# Patient Record
Sex: MALE | Race: ASIAN | NOT HISPANIC OR LATINO | URBAN - METROPOLITAN AREA
[De-identification: names, ages, dates, MRNs, and addresses within clinical notes are randomized per-mention and may not be internally consistent; named-entity substitution may affect disease eponyms.]

---

## 2020-05-11 ENCOUNTER — TELEMEDICINE (OUTPATIENT)
Dept: PRIMARY CARE | Facility: CLINIC | Age: 55
End: 2020-05-11
Payer: COMMERCIAL

## 2020-05-11 ENCOUNTER — TELEPHONE (OUTPATIENT)
Dept: PRIMARY CARE | Facility: CLINIC | Age: 55
End: 2020-05-11

## 2020-05-11 DIAGNOSIS — M47.812 CERVICAL SPONDYLOSIS: Primary | Chronic | ICD-10-CM

## 2020-05-11 DIAGNOSIS — R46.89 BEHAVIOR CONCERN: ICD-10-CM

## 2020-05-11 PROCEDURE — 99203 OFFICE O/P NEW LOW 30 MIN: CPT | Mod: 95 | Performed by: FAMILY MEDICINE

## 2020-05-11 RX ORDER — CETIRIZINE HYDROCHLORIDE 10 MG/1
10 TABLET ORAL DAILY
COMMUNITY
Start: 2016-05-24 | End: 2020-05-11 | Stop reason: ALTCHOICE

## 2020-05-11 SDOH — ECONOMIC STABILITY: FOOD INSECURITY: WITHIN THE PAST 12 MONTHS, THE FOOD YOU BOUGHT JUST DIDN'T LAST AND YOU DIDN'T HAVE MONEY TO GET MORE.: NEVER TRUE

## 2020-05-11 SDOH — ECONOMIC STABILITY: TRANSPORTATION INSECURITY: IN THE PAST 12 MONTHS, HAS LACK OF TRANSPORTATION KEPT YOU FROM MEDICAL APPOINTMENTS OR FROM GETTING MEDICATIONS?: NO

## 2020-05-11 SDOH — ECONOMIC STABILITY: FOOD INSECURITY: WITHIN THE PAST 12 MONTHS, YOU WORRIED THAT YOUR FOOD WOULD RUN OUT BEFORE YOU GOT THE MONEY TO BUY MORE.: NEVER TRUE

## 2020-05-11 ASSESSMENT — ENCOUNTER SYMPTOMS
SINUS PRESSURE: 0
NECK PAIN: 0
NAUSEA: 0
BACK PAIN: 0
FREQUENCY: 0
LIGHT-HEADEDNESS: 0
EYE REDNESS: 0
NERVOUS/ANXIOUS: 0
RHINORRHEA: 0
VOMITING: 0
FLANK PAIN: 0
WEAKNESS: 0
NUMBNESS: 0
FATIGUE: 0
ARTHRALGIAS: 0
ABDOMINAL PAIN: 0
HEMATURIA: 0
CONFUSION: 0
BRUISES/BLEEDS EASILY: 0
COUGH: 0
SPEECH DIFFICULTY: 0
SHORTNESS OF BREATH: 0
WHEEZING: 0
EYE DISCHARGE: 0
SLEEP DISTURBANCE: 0
DIFFICULTY URINATING: 0
DIZZINESS: 0
FEVER: 0
CHEST TIGHTNESS: 0
CHILLS: 0
DYSURIA: 0
MYALGIAS: 0
CONSTIPATION: 0
UNEXPECTED WEIGHT CHANGE: 0
DYSPHORIC MOOD: 0
EYE PAIN: 0
BLOOD IN STOOL: 0
APPETITE CHANGE: 0
TROUBLE SWALLOWING: 0
SORE THROAT: 0
DIARRHEA: 0
HEADACHES: 0
NECK STIFFNESS: 0
SINUS PAIN: 0

## 2020-05-11 ASSESSMENT — ACTIVITIES OF DAILY LIVING (ADL): LACK_OF_TRANSPORTATION: NO

## 2020-05-11 NOTE — PROGRESS NOTES
Rodger Cuba MD    [facility]  [address], [address]  [place] [address]  Phone: [phone]  Fax: [phone]     History of Present Illness     Subjective     Patient ID: Dru Salazar is a 54 y.o. male.      Verification of Patient Location:  The patient affirms they are currently located in the following state:[place]           Request for Consent:   Video Encounter   Ann, my name is Rodger Cuba MD.  Before we proceed, can you please verify your identification by telling me your full name and date of birth?  Can you tell me who is in the room with you?    You and I are about to have a telemedicine check-in or visit because you have requested it.  This is a live video-conference.  I am a real person, speaking to you in real time.  There is no one else with me on the video-conference.  However, when we use (Cloud[employer], doc[employer]re, etc) it is important for you to know that the video-conference may not be secure or private.  I am not recording this conversation and I am asking you not to record it.  This telemedicine visit will be billed to your health insurance or you, if you are self-insured.  You understand you will be responsible for any copayments or coinsurances that apply to your telemedicine visit.  Before starting our telemedicine visit, I am required to get your consent for this virtual check-in or visit by telemedicine. Do you consent?    Patient Response to Request for Consent:  Yes      History of cervical spondylosis. Stable at this time.     Encounter to get established.           Review of Systems   Constitutional: Negative for appetite change, chills, fatigue, fever and unexpected weight change.   HENT: Negative for congestion, ear pain, hearing loss, nosebleeds, postnasal drip, rhinorrhea, sinus pressure, sinus pain, sneezing, sore throat, tinnitus and trouble swallowing.    Eyes: Negative for pain, discharge, redness and visual disturbance.    Respiratory: Negative for cough, chest tightness, shortness of breath and wheezing.    Cardiovascular: Negative for chest pain and leg swelling.   Gastrointestinal: Negative for abdominal pain, blood in stool, constipation, diarrhea, nausea and vomiting.   Endocrine: Negative for cold intolerance and heat intolerance.   Genitourinary: Negative for decreased urine volume, difficulty urinating, dysuria, flank pain, frequency, hematuria and urgency.   Musculoskeletal: Negative for arthralgias, back pain, gait problem, myalgias, neck pain and neck stiffness.   Skin: Negative for rash.   Allergic/Immunologic: Negative for environmental allergies.   Neurological: Negative for dizziness, syncope, speech difficulty, weakness, light-headedness, numbness and headaches.   Hematological: Does not bruise/bleed easily.   Psychiatric/Behavioral: Negative for behavioral problems, confusion, dysphoric mood and sleep disturbance. The patient is not nervous/anxious.           Past Medical/Surgical/Family/Social History       The following have been reviewed and updated as appropriate in this visit:  Allergies  Meds  Problems         Past Medical History:   Diagnosis Date   • Cervical spondylosis 5/11/2020    MRI in 03/2018 with Mild multilevel degenerative disc, uncovertebral and facet joint disease with mild central canal and foraminal stenosis from C4-5 to C6-7        Past Surgical History:   Procedure Laterality Date   • WISDOM TOOTH EXTRACTION         Family History   Problem Relation Age of Onset   • Arthritis Biological Mother    • Heart disease Biological Father         CAD   • Diabetes Biological Father    • Hyperlipidemia Biological Father    • Schizophrenia Biological Sister    • No Known Problems Biological Son    • No Known Problems Biological Daughter        Social History     Tobacco Use   • Smoking status: Never Smoker   • Smokeless tobacco: Never Used   Substance Use Topics   • Alcohol use: Yes     Alcohol/week:  2.0 - 3.0 standard drinks     Types: 2 - 3 Standard drinks or equivalent per week   • Drug use: Not Currently       Patient Care Team:  Rodger Cuba MD as PCP - General (Family Medicine)       Allergies and Medications       Allergies   Allergen Reactions   • Sulfa (Sulfonamide Antibiotics) Hives         No current outpatient medications on file.     No current facility-administered medications for this visit.                 Physical Examination       Objective     There were no vitals filed for this visit.    Pulse Readings from Last 5 Encounters:   No data found for Pulse       Wt Readings from Last 5 Encounters:   No data found for Wt       Ht Readings from Last 5 Encounters:   No data found for Ht       BP Readings from Last 5 Encounters:   No data found for BP       BMI Readings from Last 4 Encounters:   No data found for BMI          Laboratory Results     No results found for: WBC, HGB, HCT, MCV, PLT     No results found for: GLUCOSE, CALCIUM, NA, K, CO2, CL, BUN, CREATININE, CALCIUM, ALKPHOS, AST, ALT, BILITOT, ALBUMIN    No results found for: CHOL  No results found for: HDL  No results found for: LDLCALC  No results found for: TRIG      No results found for: TSH    No results found for: PSA        Immunization History   Administered Date(s) Administered   • Influenza Vaccine Quadrivalent Preservative Free 6 Mons and Up 08/28/2019   • Influenza Vaccine Quadrivalent Preservative Free 6-35 Months 02/19/2018         Health Maintenance Topics with due status: Overdue       Topic Date Due    DTaP, Tdap, and Td Vaccines 07/19/1978    HIV Screening 07/19/1980    Zoster Vaccine 07/19/2017    Colonoscopy 07/19/2017     Health Maintenance Topics with due status: Completed       Topic Last Completion Date    Influenza Vaccine 08/28/2019     Health Maintenance Topics with due status: Aged Out       Topic Date Due    Meningococcal ACWY Aged Out    HIB Vaccines Aged Out    IPV Vaccines Aged Out    HPV Vaccines Aged Out     Pneumococcal Aged Out     Health Maintenance Topics with due status: Discontinued       Topic Date Due    Varicella Vaccines Discontinued        Assessment and Plan       Assessment/Plan     Problem List Items Addressed This Visit     Cervical spondylosis - Primary (Chronic)    Overview     · MRI in 03/2018 with Mild multilevel degenerative disc, uncovertebral and facet joint disease with mild central canal and foraminal stenosis from C4-5 to C6-7          Current Assessment & Plan     Stable at this time. No medications needed.          Relevant Orders    CBC and Differential    Comprehensive metabolic panel    Lipid panel    TSH 3rd Generation    PSA    Hemoglobin A1c    HIV 1,2 AB P24 AG      Other Visit Diagnoses     Behavior concern        Relevant Orders    [facility] Order for Co-Located Behaviorist Practices          · The patient is currently stable.   · Bloodwork was ordered including a complete blood count, complete metabolic profile, thyroid stimulating hormone, PSA, HIV, A1C,and a lipid profile.   · Further recommendations will be provided to the patient based on the results of the tests.   · The patient should continue to exercise at 70 percent of his maximal heart rate for 2.5 hours per week.   · All the recommend vaccinations are not up to date. Should get Shingrix and TDAP when COVID pandemic over.  · The patient should be evaluated by the ophthalmologist every 2 years and dentist every 3 months.   · The patient was advised to protect the skin by applying suntan lotion containing an SPF > 30 every 2 hours while in sun or after swimming. Instructed to avoid prolonged direct sun exposure as much as possible.   · The patient's colonoscopy is due at this time but unable to schedule at this time due to COVID pandemic. Sent stool FIT test to complete.           Orders Placed This Encounter   Procedures   • CBC and Differential     Standing Status:   Future     Number of Occurrences:   1      Standing Expiration Date:   5/11/2021   • Comprehensive metabolic panel     Standing Status:   Future     Number of Occurrences:   1     Standing Expiration Date:   5/11/2021   • Lipid panel     Standing Status:   Future     Number of Occurrences:   1     Standing Expiration Date:   5/11/2021   • TSH 3rd Generation     Standing Status:   Future     Number of Occurrences:   1     Standing Expiration Date:   5/11/2021   • PSA     Standing Status:   Future     Number of Occurrences:   1     Standing Expiration Date:   5/11/2021   • Hemoglobin A1c     Standing Status:   Future     Number of Occurrences:   1     Standing Expiration Date:   5/11/2021   • HIV 1,2 AB P24 AG     Standing Status:   Future     Number of Occurrences:   1     Standing Expiration Date:   5/11/2021   • [facility] Order for Co-Located Behaviorist Practices     Schedule appointment with Brown Reynoso.     Standing Status:   Future     Standing Expiration Date:   5/11/2021     Scheduling Instructions:      Office Staff: fax order to [phone]            Patient: [facility] [email or web address] has been asked to call you, but if you do not connect with them within one week, you may call the following number: [phone]            No current outpatient medications on file.      No current facility-administered medications for this visit.                        Order Specific Question:   What is your preferred phone number between 9AM and 5PM?     Answer:   [phone]     Order Specific Question:   Reason for referrral     Answer:   Other              Rodger Cuba MD    Return in about 5 months (around 10/11/2020) for Physical Exam.    5/11/2020      Time Spent in Medical Discussion During This Encounter:  30 minutes

## 2020-05-11 NOTE — TELEPHONE ENCOUNTER
Dr Cuba patient did not answer his phone. Left  message for callback to schedule physical in October with you.

## 2020-05-11 NOTE — Clinical Note
Please call patient and set up physical exam in 5 months. Did telemedicine visit today for chronic conditions.

## 2020-05-11 NOTE — PATIENT INSTRUCTIONS
Problem List Items Addressed This Visit     Cervical spondylosis - Primary (Chronic)     Stable at this time. No medications needed.          Relevant Orders    CBC and Differential    Comprehensive metabolic panel    Lipid panel    TSH 3rd Generation    PSA    Hemoglobin A1c    HIV 1,2 AB P24 AG      Other Visit Diagnoses     Behavior concern        Relevant Orders    [facility] Order for Co-Located Behaviorist Practices        · The patient is currently stable.   · Bloodwork was ordered including a complete blood count, complete metabolic profile, thyroid stimulating hormone, PSA, HIV, A1C,and a lipid profile.   · Further recommendations will be provided to the patient based on the results of the tests.   · The patient should continue to exercise at 70 percent of his maximal heart rate for 2.5 hours per week.   · All the recommend vaccinations are not up to date. Should get Shingrix and TDAP when COVID pandemic over.  · The patient should be evaluated by the ophthalmologist every 2 years and dentist every 3 months.   · The patient was advised to protect the skin by applying suntan lotion containing an SPF > 30 every 2 hours while in sun or after swimming. Instructed to avoid prolonged direct sun exposure as much as possible.   · The patient's colonoscopy is due at this time but unable to schedule at this time due to COVID pandemic. Sent stool FIT test to complete.

## 2020-05-11 NOTE — TELEPHONE ENCOUNTER
----- Message from Rodger Cuba MD sent at 5/11/2020  1:33 PM EDT -----  Please call patient and set up physical exam in 5 months. Did telemedicine visit today for chronic conditions.

## 2020-09-10 ENCOUNTER — TELEMEDICINE (OUTPATIENT)
Dept: PRIMARY CARE | Facility: CLINIC | Age: 55
End: 2020-09-10
Payer: COMMERCIAL

## 2020-09-10 DIAGNOSIS — M25.562 CHRONIC PAIN OF LEFT KNEE: ICD-10-CM

## 2020-09-10 DIAGNOSIS — G89.29 CHRONIC PAIN OF LEFT KNEE: ICD-10-CM

## 2020-09-10 DIAGNOSIS — M25.552 LEFT HIP PAIN: Primary | ICD-10-CM

## 2020-09-10 PROCEDURE — 99213 OFFICE O/P EST LOW 20 MIN: CPT | Mod: 95 | Performed by: FAMILY MEDICINE

## 2020-09-10 ASSESSMENT — ENCOUNTER SYMPTOMS
NUMBNESS: 0
WOUND: 0
FEVER: 0
MYALGIAS: 0
CHILLS: 0
ARTHRALGIAS: 0
WEAKNESS: 0
NECK PAIN: 0

## 2020-09-10 NOTE — PATIENT INSTRUCTIONS
Problem List Items Addressed This Visit     Left hip pain - Primary     Check xray of left hip. Further recommendations to follow.          Relevant Orders    X-RAY KNEE LEFT 3 VIEWS    Chronic pain of left knee     Check left knee xray. Further recommendations to follow.          Relevant Orders    X-RAY HIP WITH OR WITHOUT PELVIS 4+ VW LEFT

## 2020-09-10 NOTE — PROGRESS NOTES
Rodger Cuba MD    [facility]  [address], [address]  [place] [address]  Phone: [phone]  Fax: [phone]     History of Present Illness     Subjective     Patient ID: Dru Salazar is a 55 y.o. male.      Verification of Patient Location:  The patient affirms they are currently located in the following state:[place]       Request for Consent:   Audio Only Encounter   You and I are about to have a telemedicine check-in or visit. This is allowed because you have requested it. This telemedicine visit will be billed to your health insurance or you, if you are self-insured. You understand you will be responsible for any copayments or coinsurances that apply to your telemedicine visit. Before starting our telemedicine visit, I am required to get your consent for this virtual check-in or visit by telemedicine. Do you consent?    Patient Response to Request for Consent:  Yes      Patient with left hip and left knee pain. Hip is more chronic. Knee is more acute. No swelling or redness. No trauma. If he rests from playing tennis it improved.       Review of Systems   Constitutional: Negative for chills and fever.   Musculoskeletal: Negative for arthralgias, gait problem, myalgias and neck pain.        See history of present illness    Skin: Negative for rash and wound.   Neurological: Negative for weakness and numbness.        Past Medical/Surgical/Family/Social History       The following have been reviewed and updated as appropriate in this visit:  Allergies  Meds  Problems         Past Medical History:   Diagnosis Date   • Cervical spondylosis 5/11/2020    MRI in 03/2018 with Mild multilevel degenerative disc, uncovertebral and facet joint disease with mild central canal and foraminal stenosis from C4-5 to C6-7        Past Surgical History:   Procedure Laterality Date   • WISDOM TOOTH EXTRACTION         Family History   Problem Relation Age of Onset   • Arthritis  Biological Mother    • Heart disease Biological Father         CAD   • Diabetes Biological Father    • Hyperlipidemia Biological Father    • Schizophrenia Biological Sister    • No Known Problems Biological Son    • No Known Problems Biological Daughter        Social History     Tobacco Use   • Smoking status: Never Smoker   • Smokeless tobacco: Never Used   Substance Use Topics   • Alcohol use: Yes     Alcohol/week: 2.0 - 3.0 standard drinks     Types: 2 - 3 Standard drinks or equivalent per week   • Drug use: Not Currently       Patient Care Team:  Rodger Cuba MD as PCP - General (Family Medicine)       Allergies and Medications       Allergies   Allergen Reactions   • Sulfa (Sulfonamide Antibiotics) Hives         No current outpatient medications on file.     No current facility-administered medications for this visit.                 Physical Examination       Objective     There were no vitals filed for this visit.    Pulse Readings from Last 5 Encounters:   No data found for Pulse       Wt Readings from Last 5 Encounters:   No data found for Wt       Ht Readings from Last 5 Encounters:   No data found for Ht       BP Readings from Last 5 Encounters:   No data found for BP       BMI Readings from Last 4 Encounters:   No data found for BMI          Laboratory Results     No results found for: WBC, HGB, HCT, MCV, PLT     No results found for: GLUCOSE, CALCIUM, NA, K, CO2, CL, BUN, CREATININE, CALCIUM, ALKPHOS, AST, ALT, BILITOT, ALBUMIN    No results found for: CHOL  No results found for: HDL  No results found for: LDLCALC  No results found for: TRIG    No results found for: TSH    Immunization History   Administered Date(s) Administered   • Influenza Vaccine Quadrivalent Preservative Free 6 Mons and Up 08/28/2019   • Influenza Vaccine Quadrivalent Preservative Free 6-35 Months 02/19/2018       Health Maintenance Topics with due status: Overdue       Topic Date Due    DTaP, Tdap, and Td Vaccines 07/19/1978     HIV Screening 07/19/1980    Hepatitis C Screening 07/19/1985    Zoster Vaccine 07/19/2017    Colonoscopy 07/19/2017    Influenza Vaccine 08/01/2020     Health Maintenance Topics with due status: Aged Out       Topic Date Due    Meningococcal ACWY Aged Out    HIB Vaccines Aged Out    IPV Vaccines Aged Out    HPV Vaccines Aged Out    Pneumococcal Aged Out     Health Maintenance Topics with due status: Discontinued       Topic Date Due    Varicella Vaccines Discontinued        Assessment and Plan       Assessment/Plan     Problem List Items Addressed This Visit     Left hip pain - Primary    Current Assessment & Plan     Check xray of left hip. Further recommendations to follow.          Relevant Orders    X-RAY KNEE LEFT 3 VIEWS    Chronic pain of left knee    Current Assessment & Plan     Check left knee xray. Further recommendations to follow.          Relevant Orders    X-RAY HIP WITH OR WITHOUT PELVIS 4+ VW LEFT          Orders Placed This Encounter   Procedures   • X-RAY KNEE LEFT 3 VIEWS     Standing Status:   Future     Standing Expiration Date:   9/10/2021   • X-RAY HIP WITH OR WITHOUT PELVIS 4+ VW LEFT     Standing Status:   Future     Standing Expiration Date:   9/10/2021              Rodger Cuba MD    Return if symptoms worsen or fail to improve.    9/10/2020      Time Spent in Medical Discussion During This Encounter:  15 minutes

## 2020-12-07 ENCOUNTER — TELEPHONE (OUTPATIENT)
Dept: PRIMARY CARE | Facility: CLINIC | Age: 55
End: 2020-12-07

## 2020-12-07 DIAGNOSIS — Z12.11 COLON CANCER SCREENING: Primary | ICD-10-CM

## 2020-12-07 NOTE — TELEPHONE ENCOUNTER
· I called the patient I gave him Dr Noble's information and told him that the GI offices are overwhelmed with trying to get patient's in and I would send him a FIT kit to be completed by the end of the year to suffice until he can get in with Dr Noble's office he said that he put it off for 3-4 years and now has decided to do so all information was given he said that he would call . I confirmed patient's address.   · Dr Cuba I did the order I will mail it to the patient

## 2020-12-07 NOTE — TELEPHONE ENCOUNTER
Pt is interested in a colonoscopy, and wanted to know if we could recommend somewhere/could get a script for it? Can someone reach out?

## 2021-01-06 ENCOUNTER — TELEPHONE (OUTPATIENT)
Dept: PRIMARY CARE | Facility: CLINIC | Age: 56
End: 2021-01-06

## 2021-01-06 ENCOUNTER — DOCUMENTATION (OUTPATIENT)
Dept: PRIMARY CARE | Facility: CLINIC | Age: 56
End: 2021-01-06

## 2021-01-06 LAB
HEMOCCULT STL QL IA: NEGATIVE
SPECIMEN STATUS: NORMAL

## 2021-01-06 NOTE — TELEPHONE ENCOUNTER
· ----- Message from Rodger Cuba MD sent at 1/6/2021 12:13 PM EST -----  · Let patient know stool FIT was negative.  · I called the patient I left a message on her cell that her FIT test was negative.   · I asked her to call back and confirm the message.

## 2021-01-07 NOTE — TELEPHONE ENCOUNTER
· I called the patient he had further questions regarding his upcoming physical scheduled Monday and he wanted to know if he will need a stress test and I explained to the patient that it is a discussion at the time of the visit because his physical entails his overall health maintenance and family history and vaccines that may be needed at that time . I also informed him that the dr issued a lab slip when he had a telemedicine appt with the dr which he hadn't done and I told him that it was necessary that he do them prior to the visit to alleviate an additional phone call at the time of the visit he said should I cancel his appointment I told him no because the dr's  Schedule is going out into May he has decided to keep his physical appt ( the lab slip issued  today ) I also explained to him that he has to come into the office for the appt and come alone wear a mask & arrive 15 min prior due to all the the construction and there is protocols in place fur to covid . He did confirm he received the message regarding the FIT test.

## 2021-01-11 ENCOUNTER — TELEPHONE (OUTPATIENT)
Dept: PRIMARY CARE | Facility: CLINIC | Age: 56
End: 2021-01-11

## 2021-01-11 ENCOUNTER — OFFICE VISIT (OUTPATIENT)
Dept: PRIMARY CARE | Facility: CLINIC | Age: 56
End: 2021-01-11
Payer: COMMERCIAL

## 2021-01-11 VITALS
OXYGEN SATURATION: 98 % | RESPIRATION RATE: 16 BRPM | WEIGHT: 170.2 LBS | TEMPERATURE: 97.6 F | DIASTOLIC BLOOD PRESSURE: 78 MMHG | HEART RATE: 65 BPM | HEIGHT: 70 IN | SYSTOLIC BLOOD PRESSURE: 112 MMHG | BODY MASS INDEX: 24.37 KG/M2

## 2021-01-11 DIAGNOSIS — Z00.00 ENCOUNTER FOR GENERAL ADULT MEDICAL EXAMINATION WITHOUT ABNORMAL FINDINGS: Primary | ICD-10-CM

## 2021-01-11 PROBLEM — M25.552 LEFT HIP PAIN: Status: RESOLVED | Noted: 2020-09-10 | Resolved: 2021-01-11

## 2021-01-11 PROBLEM — G89.29 CHRONIC PAIN OF LEFT KNEE: Status: RESOLVED | Noted: 2020-09-10 | Resolved: 2021-01-11

## 2021-01-11 PROBLEM — M25.562 CHRONIC PAIN OF LEFT KNEE: Status: RESOLVED | Noted: 2020-09-10 | Resolved: 2021-01-11

## 2021-01-11 PROCEDURE — 90472 IMMUNIZATION ADMIN EACH ADD: CPT | Performed by: FAMILY MEDICINE

## 2021-01-11 PROCEDURE — 90715 TDAP VACCINE 7 YRS/> IM: CPT | Performed by: FAMILY MEDICINE

## 2021-01-11 PROCEDURE — 90471 IMMUNIZATION ADMIN: CPT | Performed by: FAMILY MEDICINE

## 2021-01-11 PROCEDURE — 99396 PREV VISIT EST AGE 40-64: CPT | Mod: 25 | Performed by: FAMILY MEDICINE

## 2021-01-11 PROCEDURE — 90750 HZV VACC RECOMBINANT IM: CPT | Performed by: FAMILY MEDICINE

## 2021-01-11 SDOH — HEALTH STABILITY: MENTAL HEALTH: HOW OFTEN DO YOU HAVE A DRINK CONTAINING ALCOHOL?: MONTHLY OR LESS

## 2021-01-11 SDOH — HEALTH STABILITY: PHYSICAL HEALTH: ON AVERAGE, HOW MANY MINUTES DO YOU ENGAGE IN EXERCISE AT THIS LEVEL?: 30 MIN

## 2021-01-11 SDOH — HEALTH STABILITY: MENTAL HEALTH: HOW OFTEN DO YOU HAVE SIX OR MORE DRINKS ON ONE OCCASION?: NEVER

## 2021-01-11 SDOH — HEALTH STABILITY: PHYSICAL HEALTH: ON AVERAGE, HOW MANY DAYS PER WEEK DO YOU ENGAGE IN MODERATE TO STRENUOUS EXERCISE (LIKE A BRISK WALK)?: 6 DAYS

## 2021-01-11 SDOH — HEALTH STABILITY: MENTAL HEALTH: HOW MANY DRINKS CONTAINING ALCOHOL DO YOU HAVE ON A TYPICAL DAY WHEN YOU ARE DRINKING?: 1 OR 2

## 2021-01-11 ASSESSMENT — ENCOUNTER SYMPTOMS
FREQUENCY: 0
DYSPHORIC MOOD: 0
EYE REDNESS: 0
CHEST TIGHTNESS: 0
LIGHT-HEADEDNESS: 0
CHILLS: 0
RHINORRHEA: 0
WHEEZING: 0
WEAKNESS: 0
DYSURIA: 0
NUMBNESS: 0
ARTHRALGIAS: 0
EYE DISCHARGE: 0
CONFUSION: 0
SINUS PAIN: 0
VOMITING: 0
NAUSEA: 0
SORE THROAT: 0
NECK STIFFNESS: 0
ABDOMINAL PAIN: 0
SPEECH DIFFICULTY: 0
DIZZINESS: 0
SINUS PRESSURE: 0
BLOOD IN STOOL: 0
FATIGUE: 0
FEVER: 0
BACK PAIN: 0
APPETITE CHANGE: 0
BRUISES/BLEEDS EASILY: 0
NECK PAIN: 0
SHORTNESS OF BREATH: 0
HEMATURIA: 0
FLANK PAIN: 0
TROUBLE SWALLOWING: 0
SLEEP DISTURBANCE: 0
MYALGIAS: 0
COUGH: 0
EYE PAIN: 0
CONSTIPATION: 0
UNEXPECTED WEIGHT CHANGE: 0
DIARRHEA: 0
DIFFICULTY URINATING: 0
NERVOUS/ANXIOUS: 0
HEADACHES: 0

## 2021-01-11 NOTE — PATIENT INSTRUCTIONS
Problem List Items Addressed This Visit     Encounter for general adult medical examination without abnormal findings - Primary     · The patient is currently stable.   · Bloodwork was ordered including a complete blood count, complete metabolic profile, thyroid stimulating hormone, and a lipid profile.   · Further recommendations will be provided to the patient based on the results of the tests.   · The patient should continue to exercise at 70 percent of his maximal heart rate for 2.5 hours per week.   · All the recommend vaccinations are not up to date. TDAP given today. Given Shingrix #1 today and return to office in 2 months for #2.  · The patient should be evaluated by the ophthalmologist every 2 years and dentist every 3 months.   · The patient was advised to protect the skin by applying suntan lotion containing an SPF > 30 every 2 hours while in sun or after swimming. Instructed to avoid prolonged direct sun exposure as much as possible.   · The patient's body mass index is normal.   · The patient's colonoscopy is due at this time. Has scheduled for 03/02/2021.  · Advised to consume 1000 mg of calcium daily through the diet. If the patient is unable to consume 1000 mg through the diet, then taking calcium supplements is an acceptable alternative. The patient was informed not to consume more than 500 mg of a calcium supplement at a time.                    Patient Education     Health Maintenance, Male  Adopting a healthy lifestyle and getting preventive care are important in promoting health and wellness. Ask your health care provider about:  · The right schedule for you to have regular tests and exams.  · Things you can do on your own to prevent diseases and keep yourself healthy.  What should I know about diet, weight, and exercise?  Eat a healthy diet    · Eat a diet that includes plenty of vegetables, fruits, low-fat dairy products, and lean protein.  · Do not eat a lot of foods that are high in solid  fats, added sugars, or sodium.  Maintain a healthy weight  Body mass index (BMI) is a measurement that can be used to identify possible weight problems. It estimates body fat based on height and weight. Your health care provider can help determine your BMI and help you achieve or maintain a healthy weight.  Get regular exercise  Get regular exercise. This is one of the most important things you can do for your health. Most adults should:  · Exercise for at least 150 minutes each week. The exercise should increase your heart rate and make you sweat (moderate-intensity exercise).  · Do strengthening exercises at least twice a week. This is in addition to the moderate-intensity exercise.  · Spend less time sitting. Even light physical activity can be beneficial.  Watch cholesterol and blood lipids  Have your blood tested for lipids and cholesterol at 20 years of age, then have this test every 5 years.  You may need to have your cholesterol levels checked more often if:  · Your lipid or cholesterol levels are high.  · You are older than 40 years of age.  · You are at high risk for heart disease.  What should I know about cancer screening?  Many types of cancers can be detected early and may often be prevented. Depending on your health history and family history, you may need to have cancer screening at various ages. This may include screening for:  · Colorectal cancer.  · Prostate cancer.  · Skin cancer.  · Lung cancer.  What should I know about heart disease, diabetes, and high blood pressure?  Blood pressure and heart disease  · High blood pressure causes heart disease and increases the risk of stroke. This is more likely to develop in people who have high blood pressure readings, are of  descent, or are overweight.  · Talk with your health care provider about your target blood pressure readings.  · Have your blood pressure checked:  ? Every 3-5 years if you are 18-39 years of age.  ? Every year if you are 40  years old or older.  · If you are between the ages of 65 and 75 and are a current or former smoker, ask your health care provider if you should have a one-time screening for abdominal aortic aneurysm (AAA).  Diabetes  Have regular diabetes screenings. This checks your fasting blood sugar level. Have the screening done:  · Once every three years after age 45 if you are at a normal weight and have a low risk for diabetes.  · More often and at a younger age if you are overweight or have a high risk for diabetes.  What should I know about preventing infection?  Hepatitis B  If you have a higher risk for hepatitis B, you should be screened for this virus. Talk with your health care provider to find out if you are at risk for hepatitis B infection.  Hepatitis C  Blood testing is recommended for:  · Everyone born from 1945 through 1965.  · Anyone with known risk factors for hepatitis C.  Sexually transmitted infections (STIs)  · You should be screened each year for STIs, including gonorrhea and chlamydia, if:  ? You are sexually active and are younger than 24 years of age.  ? You are older than 24 years of age and your health care provider tells you that you are at risk for this type of infection.  ? Your sexual activity has changed since you were last screened, and you are at increased risk for chlamydia or gonorrhea. Ask your health care provider if you are at risk.  · Ask your health care provider about whether you are at high risk for HIV. Your health care provider may recommend a prescription medicine to help prevent HIV infection. If you choose to take medicine to prevent HIV, you should first get tested for HIV. You should then be tested every 3 months for as long as you are taking the medicine.  Follow these instructions at home:  Lifestyle  · Do not use any products that contain nicotine or tobacco, such as cigarettes, e-cigarettes, and chewing tobacco. If you need help quitting, ask your health care  provider.  · Do not use street drugs.  · Do not share needles.  · Ask your health care provider for help if you need support or information about quitting drugs.  Alcohol use  · Do not drink alcohol if your health care provider tells you not to drink.  · If you drink alcohol:  ? Limit how much you have to 0-2 drinks a day.  ? Be aware of how much alcohol is in your drink. In the [place] one drink equals one 12 oz bottle of beer (355 mL), one 5 oz glass of wine (148 mL), or one 1½ oz glass of hard liquor (44 mL).  General instructions  · Schedule regular health, dental, and eye exams.  · Stay current with your vaccines.  · Tell your health care provider if:  ? You often feel depressed.  ? You have ever been abused or do not feel safe at home.  Summary  · Adopting a healthy lifestyle and getting preventive care are important in promoting health and wellness.  · Follow your health care provider's instructions about healthy diet, exercising, and getting tested or screened for diseases.  · Follow your health care provider's instructions on monitoring your cholesterol and blood pressure.  This information is not intended to replace advice given to you by your health care provider. Make sure you discuss any questions you have with your health care provider.  Document Released: 06/15/2009 Document Revised: 12/11/2019 Document Reviewed: 12/11/2019  [employer] Patient Education © 2020 [employer]

## 2021-01-11 NOTE — TELEPHONE ENCOUNTER
· I called [facility] I spoke with Isidro to get patient's labs that were done in [place] she said to try to get the accession number it will be easier for them to find I spoke with the patient 2-17-20 [id number] I called [facility] back I spoke with Rachel I gave her the information she faxed over the labs done that date.   · Dr Cuba I placed them in the folder.

## 2021-01-11 NOTE — ASSESSMENT & PLAN NOTE
· The patient is currently stable.   · Bloodwork was ordered including a complete blood count, complete metabolic profile, thyroid stimulating hormone, and a lipid profile.   · Further recommendations will be provided to the patient based on the results of the tests.   · The patient should continue to exercise at 70 percent of his maximal heart rate for 2.5 hours per week.   · All the recommend vaccinations are not up to date. TDAP given today. Given Shingrix #1 today and return to office in 2 months for #2.  · The patient should be evaluated by the ophthalmologist every 2 years and dentist every 3 months.   · The patient was advised to protect the skin by applying suntan lotion containing an SPF > 30 every 2 hours while in sun or after swimming. Instructed to avoid prolonged direct sun exposure as much as possible.   · The patient's body mass index is normal.   · The patient's colonoscopy is due at this time. Has scheduled for 03/02/2021.  · Advised to consume 1000 mg of calcium daily through the diet. If the patient is unable to consume 1000 mg through the diet, then taking calcium supplements is an acceptable alternative. The patient was informed not to consume more than 500 mg of a calcium supplement at a time.

## 2021-01-11 NOTE — PROGRESS NOTES
Rodger Cuba MD    [facility]  [address], [address]  [place] [address]  Phone: [phone]  Fax: [phone]     History of Present Illness     Subjective     Patient ID: Dru Salazar is a 55 y.o. male.    The patient present for an annual preventative visit.         Past Medical/Surgical/Family/Social History       The following have been reviewed and updated as appropriate in this visit:  Allergies  Meds  Problems         Past Medical History:   Diagnosis Date   • Cervical spondylosis 5/11/2020    MRI in 03/2018 with Mild multilevel degenerative disc, uncovertebral and facet joint disease with mild central canal and foraminal stenosis from C4-5 to C6-7        Past Surgical History:   Procedure Laterality Date   • WISDOM TOOTH EXTRACTION         Family History   Problem Relation Age of Onset   • Arthritis Biological Mother    • Heart disease Biological Father         CAD   • Diabetes Biological Father    • Hyperlipidemia Biological Father    • Schizophrenia Biological Sister    • No Known Problems Biological Son    • No Known Problems Biological Daughter        Social History     Tobacco Use   • Smoking status: Never Smoker   • Smokeless tobacco: Never Used   Substance Use Topics   • Alcohol use: Yes     Alcohol/week: 1.0 standard drinks     Types: 1 Standard drinks or equivalent per week     Frequency: Monthly or less     Drinks per session: 1 or 2     Binge frequency: Never   • Drug use: Not Currently       Patient Care Team:  Rodger Cuba MD as PCP - General (Family Medicine)  Randall Noble MD as Consulting Physician (Gastroenterology)       Allergies and Medications       Allergies   Allergen Reactions   • Sulfa (Sulfonamide Antibiotics) Hives         No current outpatient medications on file.     No current facility-administered medications for this visit.           Review of Systems       Review of Systems   Constitutional: Negative for appetite  "change, chills, fatigue, fever and unexpected weight change.   HENT: Negative for congestion, ear pain, hearing loss, nosebleeds, postnasal drip, rhinorrhea, sinus pressure, sinus pain, sneezing, sore throat, tinnitus and trouble swallowing.    Eyes: Negative for pain, discharge, redness and visual disturbance.   Respiratory: Negative for cough, chest tightness, shortness of breath and wheezing.    Cardiovascular: Negative for chest pain and leg swelling.   Gastrointestinal: Negative for abdominal pain, blood in stool, constipation, diarrhea, nausea and vomiting.   Endocrine: Negative for cold intolerance and heat intolerance.   Genitourinary: Negative for decreased urine volume, difficulty urinating, dysuria, flank pain, frequency, hematuria and urgency.   Musculoskeletal: Negative for arthralgias, back pain, gait problem, myalgias, neck pain and neck stiffness.   Skin: Negative for rash.   Allergic/Immunologic: Negative for environmental allergies.   Neurological: Negative for dizziness, syncope, speech difficulty, weakness, light-headedness, numbness and headaches.   Hematological: Does not bruise/bleed easily.   Psychiatric/Behavioral: Negative for behavioral problems, confusion, dysphoric mood and sleep disturbance. The patient is not nervous/anxious.         Physical Examination       Objective     Vitals:    01/11/21 1342   BP: 112/78   Pulse: 65   Resp: 16   Temp: 36.4 °C (97.6 °F)   SpO2: 98%       Pulse Readings from Last 5 Encounters:   01/11/21 65       Wt Readings from Last 5 Encounters:   01/11/21 77.2 kg (170 lb 3.2 oz)       Ht Readings from Last 5 Encounters:   01/11/21 1.778 m (5' 10\")       BP Readings from Last 5 Encounters:   01/11/21 112/78       BMI Readings from Last 4 Encounters:   01/11/21 24.42 kg/m²       Physical Exam  Constitutional:       General: He is not in acute distress.     Appearance: Normal appearance. He is well-developed. He is not ill-appearing, toxic-appearing or " diaphoretic.   HENT:      Head: Normocephalic and atraumatic.      Right Ear: Tympanic membrane, ear canal and external ear normal.      Left Ear: Tympanic membrane, ear canal and external ear normal.      Nose: No congestion or rhinorrhea.      Mouth/Throat:      Pharynx: No oropharyngeal exudate or posterior oropharyngeal erythema.   Eyes:      General: Lids are normal.      Conjunctiva/sclera: Conjunctivae normal.      Pupils: Pupils are equal, round, and reactive to light.   Neck:      Musculoskeletal: Full passive range of motion without pain, normal range of motion and neck supple.      Thyroid: No thyromegaly.      Vascular: No carotid bruit.   Cardiovascular:      Rate and Rhythm: Normal rate and regular rhythm.      Pulses:           Popliteal pulses are 2+ on the right side and 2+ on the left side.        Dorsalis pedis pulses are 2+ on the right side and 2+ on the left side.      Heart sounds: Normal heart sounds. No murmur. No gallop.    Pulmonary:      Effort: Pulmonary effort is normal. No tachypnea, accessory muscle usage or respiratory distress.      Breath sounds: Normal breath sounds. No decreased breath sounds, wheezing, rhonchi or rales.   Abdominal:      General: Bowel sounds are normal. There is no distension.      Palpations: Abdomen is soft.      Tenderness: There is no abdominal tenderness. There is no guarding or rebound.      Hernia: No hernia is present. There is no hernia in the left inguinal area or right inguinal area.   Genitourinary:     Penis: Normal.       Scrotum/Testes: Normal.      Prostate: Normal.   Musculoskeletal:      Right lower leg: No edema.      Left lower leg: No edema.   Lymphadenopathy:      Cervical: No cervical adenopathy.   Skin:     General: Skin is warm and dry.      Findings: No rash.   Neurological:      Mental Status: He is alert.   Psychiatric:         Mood and Affect: Mood normal. Mood is not anxious or depressed.         Speech: Speech normal.          Behavior: Behavior normal. Behavior is cooperative.         Thought Content: Thought content normal.         Judgment: Judgment normal.        Laboratory Results     No results found for: WBC, HGB, HCT, MCV, PLT     No results found for: GLUCOSE, CALCIUM, NA, K, CO2, CL, BUN, CREATININE, CALCIUM, ALKPHOS, AST, ALT, BILITOT, ALBUMIN    No results found for: CHOL  No results found for: HDL  No results found for: LDLCALC  No results found for: TRIG    No results found for: TSH    No results found for: HGBA1C    No results found for: PSA    No results found for: HEPCAB    Immunization History   Administered Date(s) Administered   • Influenza Vaccine Quadrivalent Preservative Free 6 Mons and Up 08/28/2019   • Influenza Vaccine Quadrivalent Preservative Free 6-35 Months 02/19/2018   • Influenza, Quadrivalent 09/19/2020     Health Maintenance Topics with due status: Overdue       Topic Date Due    HIV Screening 07/19/1980    Hepatitis C Screening 07/19/1985    DTaP, Tdap, and Td Vaccines 07/19/1986    Zoster Vaccine 07/19/2017    Colonoscopy 07/19/2017     Health Maintenance Topics with due status: Completed       Topic Last Completion Date    Influenza Vaccine 09/19/2020     Health Maintenance Topics with due status: Aged Out       Topic Date Due    Meningococcal ACWY Aged Out    HIB Vaccines Aged Out    IPV Vaccines Aged Out    HPV Vaccines Aged Out    Pneumococcal Aged Out     Health Maintenance Topics with due status: Discontinued       Topic Date Due    Varicella Vaccines Discontinued        Assessment and Plan       Assessment/Plan     Problem List Items Addressed This Visit     Encounter for general adult medical examination without abnormal findings - Primary    Current Assessment & Plan     · The patient is currently stable.   · Bloodwork was ordered including a complete blood count, complete metabolic profile, thyroid stimulating hormone, and a lipid profile.   · Further recommendations will be provided to the  patient based on the results of the tests.   · The patient should continue to exercise at 70 percent of his maximal heart rate for 2.5 hours per week.   · All the recommend vaccinations are not up to date. TDAP given today. Given Shingrix #1 today and return to office in 2 months for #2.  · The patient should be evaluated by the ophthalmologist every 2 years and dentist every 3 months.   · The patient was advised to protect the skin by applying suntan lotion containing an SPF > 30 every 2 hours while in sun or after swimming. Instructed to avoid prolonged direct sun exposure as much as possible.   · The patient's body mass index is normal.   · The patient's colonoscopy is due at this time. Has scheduled for 03/02/2021.  · Advised to consume 1000 mg of calcium daily through the diet. If the patient is unable to consume 1000 mg through the diet, then taking calcium supplements is an acceptable alternative. The patient was informed not to consume more than 500 mg of a calcium supplement at a time.                    Return in about 1 year (around 1/11/2022) for Physical Exam.    Orders Placed This Encounter   Procedures   • Tdap vaccine greater than or equal to 6yo IM   • Shingrix              Rodger Cuba MD    1/11/2021

## 2021-01-12 ENCOUNTER — DOCUMENTATION (OUTPATIENT)
Dept: PRIMARY CARE | Facility: CLINIC | Age: 56
End: 2021-01-12

## 2021-01-12 LAB
ALBUMIN SERPL-MCNC: 4.4 G/DL (ref 3.8–4.9)
ALBUMIN/GLOB SERPL: 1.6 {RATIO} (ref 1.2–2.2)
ALP SERPL-CCNC: 62 IU/L (ref 39–117)
ALT SERPL-CCNC: 16 IU/L (ref 0–44)
AST SERPL-CCNC: 21 IU/L (ref 0–40)
BASOPHILS # BLD AUTO: 0.1 X10E3/UL (ref 0–0.2)
BASOPHILS NFR BLD AUTO: 1 %
BILIRUB SERPL-MCNC: 0.3 MG/DL (ref 0–1.2)
BUN SERPL-MCNC: 12 MG/DL (ref 6–24)
BUN/CREAT SERPL: 13 (ref 9–20)
CALCIUM SERPL-MCNC: 9 MG/DL (ref 8.7–10.2)
CHLORIDE SERPL-SCNC: 101 MMOL/L (ref 96–106)
CHOLEST SERPL-MCNC: 177 MG/DL (ref 100–199)
CO2 SERPL-SCNC: 26 MMOL/L (ref 20–29)
CREAT SERPL-MCNC: 0.95 MG/DL (ref 0.76–1.27)
EOSINOPHIL # BLD AUTO: 0.1 X10E3/UL (ref 0–0.4)
EOSINOPHIL NFR BLD AUTO: 2 %
ERYTHROCYTE [DISTWIDTH] IN BLOOD BY AUTOMATED COUNT: 12.9 % (ref 11.6–15.4)
GLOBULIN SER CALC-MCNC: 2.8 G/DL (ref 1.5–4.5)
GLUCOSE SERPL-MCNC: 88 MG/DL (ref 65–99)
HBA1C MFR BLD: 5.4 % (ref 4.8–5.6)
HCT VFR BLD AUTO: 42.5 % (ref 37.5–51)
HDLC SERPL-MCNC: 52 MG/DL
HGB BLD-MCNC: 14.4 G/DL (ref 13–17.7)
HIV 1+2 AB+HIV1 P24 AG SERPL QL IA: NON REACTIVE
IMM GRANULOCYTES # BLD AUTO: 0 X10E3/UL (ref 0–0.1)
IMM GRANULOCYTES NFR BLD AUTO: 0 %
LAB CORP EGFR IF AFRICN AM: 105 ML/MIN/1.73
LAB CORP EGFR IF NONAFRICN AM: 91 ML/MIN/1.73
LDLC SERPL CALC-MCNC: 100 MG/DL (ref 0–99)
LYMPHOCYTES # BLD AUTO: 2.8 X10E3/UL (ref 0.7–3.1)
LYMPHOCYTES NFR BLD AUTO: 43 %
MCH RBC QN AUTO: 28.6 PG (ref 26.6–33)
MCHC RBC AUTO-ENTMCNC: 33.9 G/DL (ref 31.5–35.7)
MCV RBC AUTO: 84 FL (ref 79–97)
MONOCYTES # BLD AUTO: 0.6 X10E3/UL (ref 0.1–0.9)
MONOCYTES NFR BLD AUTO: 9 %
MORPHOLOGY BLD-IMP: NORMAL
NEUTROPHILS # BLD AUTO: 3 X10E3/UL (ref 1.4–7)
NEUTROPHILS NFR BLD AUTO: 45 %
PLATELET # BLD AUTO: NORMAL X10E3/UL
POTASSIUM SERPL-SCNC: 3.9 MMOL/L (ref 3.5–5.2)
PROT SERPL-MCNC: 7.2 G/DL (ref 6–8.5)
PSA SERPL-MCNC: 1.3 NG/ML (ref 0–4)
RBC # BLD AUTO: 5.04 X10E6/UL (ref 4.14–5.8)
SODIUM SERPL-SCNC: 140 MMOL/L (ref 134–144)
TRIGL SERPL-MCNC: 141 MG/DL (ref 0–149)
TSH SERPL DL<=0.005 MIU/L-ACNC: 0.71 UIU/ML (ref 0.45–4.5)
VLDLC SERPL CALC-MCNC: 25 MG/DL (ref 5–40)
WBC # BLD AUTO: 6.5 X10E3/UL (ref 3.4–10.8)

## 2021-01-12 NOTE — PROGRESS NOTES
Patient notified that all lab tests were stable. Advised to make no changes in current medical regimen.

## 2021-02-10 ENCOUNTER — TELEPHONE (OUTPATIENT)
Dept: PRIMARY CARE | Facility: CLINIC | Age: 56
End: 2021-02-10

## 2021-02-10 NOTE — TELEPHONE ENCOUNTER
· I called the patient I apologized for the delay and asked him what he specifically needed to see an orthopedic for what specific part of his body did he need to see the orthopedic because when he called he didn't mention that I asked him to call me at his earliest convenience.

## 2021-02-15 ENCOUNTER — TRANSCRIBE ORDERS (OUTPATIENT)
Dept: SCHEDULING | Age: 56
End: 2021-02-15

## 2021-02-15 ENCOUNTER — HOSPITAL ENCOUNTER (OUTPATIENT)
Dept: RADIOLOGY | Age: 56
Discharge: HOME | End: 2021-02-15
Attending: ORTHOPAEDIC SURGERY
Payer: COMMERCIAL

## 2021-02-15 DIAGNOSIS — M25.562 PAIN IN LEFT KNEE: ICD-10-CM

## 2021-02-15 DIAGNOSIS — M25.562 PAIN IN LEFT KNEE: Primary | ICD-10-CM

## 2021-03-26 ENCOUNTER — TELEPHONE (OUTPATIENT)
Dept: PRIMARY CARE | Facility: CLINIC | Age: 56
End: 2021-03-26

## 2021-03-26 DIAGNOSIS — Z20.2 STD EXPOSURE: Primary | ICD-10-CM

## 2021-03-26 NOTE — TELEPHONE ENCOUNTER
Pt requesting full Lab work up Blood and STD along With HIV testing       This patient has an upcoming appointment with your office and would like to have their lab work completed prior to the visit.         Patient Preferred Laboratory:[facility]     Patient Primary Insurance in Epic:  N/A         or [place] Mail?:Mail

## 2021-03-26 NOTE — TELEPHONE ENCOUNTER
Please generate lab requisition slip for a CBC, CMP, Lipid profile, TSH, PSA, and Hepatitis C antibody.

## 2021-03-26 NOTE — TELEPHONE ENCOUNTER
"Pt called in to make sure that he will be given \"the fasting panel\". Was very mad that no one called him, was under the impression that someone would reach out to tell him what tests were ordered?  "

## 2021-03-26 NOTE — TELEPHONE ENCOUNTER
· I called the patient informed him what was ordered and he doesn't need to have his cholesterol rechecked dr cantu said it is fine I mailed it to the patient.

## 2021-03-26 NOTE — TELEPHONE ENCOUNTER
· I called the patient explained to him that I saw his message and it was addressed and he thought that I had forgotten about him and wasn't going to call him back I explained to him that I got the answer and I was busy rooming patients and I wasn't able to call him back in between he said that he is sorry I told him that I am conscientious at my job and I finish what needs to be addressed that day   · He is in the process of getting engaged and he is requesting per his fiance so that he starts off with a clean slate if he could possibly have an order for Hiv and STD testing I told him I don't know if the insurance will cover it in the same calender year since it was already done in January 2021 he wanted to know if you could work some magic so he is able to He also wanted to let you know that when he did his cholesterol in January he was not fasting ?

## 2021-11-24 ENCOUNTER — TELEPHONE (OUTPATIENT)
Dept: PRIMARY CARE | Facility: CLINIC | Age: 56
End: 2021-11-24

## 2021-11-24 DIAGNOSIS — Z00.00 ENCOUNTER FOR GENERAL ADULT MEDICAL EXAMINATION WITHOUT ABNORMAL FINDINGS: Primary | ICD-10-CM

## 2021-11-24 NOTE — TELEPHONE ENCOUNTER
Pt schedule for annual physical with Dr Cuba on 01/11/2022 and would like b/w done prior to apt.  Please call pt once b/w order is placed

## 2021-11-24 NOTE — TELEPHONE ENCOUNTER
· I called the patient informed him that the dr is out until Monday and I will see check with the dr then he would like it coded as his annual physical and he originally wanted [facility] [phone] then decided that he would like it mailed to him.

## 2021-11-29 NOTE — TELEPHONE ENCOUNTER
· I did the order I called the patient I left a message on his cell that the order was placed and sent to him.

## 2021-11-29 NOTE — TELEPHONE ENCOUNTER
Please generate lab requisition slip for a CBC, CMP, Lipid profile, TSH, Hepatitis C antibody, and PSA.

## 2022-01-05 ENCOUNTER — TELEPHONE (OUTPATIENT)
Dept: PRIMARY CARE | Facility: CLINIC | Age: 57
End: 2022-01-05

## 2022-01-05 NOTE — TELEPHONE ENCOUNTER
· I called the patient he is approximately 10-15 min ago I told him we ask that you arrive 15 min prior to the appt he is having surgery with dr sky for a torn meniscus tear left knee [facility] [place]  no scheduled date that have him on a cancellation list when it comes available I told him also that he stills needs to check in which takes time

## 2022-01-06 ENCOUNTER — CONSULT (OUTPATIENT)
Dept: PRIMARY CARE | Facility: CLINIC | Age: 57
End: 2022-01-06
Payer: COMMERCIAL

## 2022-01-06 VITALS
SYSTOLIC BLOOD PRESSURE: 112 MMHG | TEMPERATURE: 98 F | HEART RATE: 84 BPM | OXYGEN SATURATION: 98 % | HEIGHT: 71 IN | RESPIRATION RATE: 16 BRPM | DIASTOLIC BLOOD PRESSURE: 78 MMHG | BODY MASS INDEX: 25.2 KG/M2 | WEIGHT: 180 LBS

## 2022-01-06 DIAGNOSIS — Z01.818 PREOP EXAMINATION: Primary | ICD-10-CM

## 2022-01-06 PROCEDURE — 3008F BODY MASS INDEX DOCD: CPT | Performed by: FAMILY MEDICINE

## 2022-01-06 PROCEDURE — 99212 OFFICE O/P EST SF 10 MIN: CPT | Performed by: FAMILY MEDICINE

## 2022-01-06 ASSESSMENT — ENCOUNTER SYMPTOMS
SORE THROAT: 0
VOMITING: 0
LIGHT-HEADEDNESS: 0
CHEST TIGHTNESS: 0
FEVER: 0
EYE REDNESS: 0
RHINORRHEA: 0
CHILLS: 0
NECK STIFFNESS: 0
TROUBLE SWALLOWING: 0
DIFFICULTY URINATING: 0
DYSURIA: 0
SHORTNESS OF BREATH: 0
FREQUENCY: 0
SPEECH DIFFICULTY: 0
MYALGIAS: 0
UNEXPECTED WEIGHT CHANGE: 0
EYE PAIN: 0
NERVOUS/ANXIOUS: 0
APPETITE CHANGE: 0
FLANK PAIN: 0
DYSPHORIC MOOD: 0
WEAKNESS: 0
CONSTIPATION: 0
FATIGUE: 0
COUGH: 0
ARTHRALGIAS: 0
HEADACHES: 0
NAUSEA: 0
NUMBNESS: 0
SINUS PAIN: 0
WHEEZING: 0
BLOOD IN STOOL: 0
EYE DISCHARGE: 0
HEMATURIA: 0
ABDOMINAL PAIN: 0
BACK PAIN: 0
DIZZINESS: 0
BRUISES/BLEEDS EASILY: 0
DIARRHEA: 0
CONFUSION: 0
SLEEP DISTURBANCE: 0
SINUS PRESSURE: 0
NECK PAIN: 0

## 2022-01-06 NOTE — PATIENT INSTRUCTIONS
Problem List Items Addressed This Visit     Preop examination - Primary     The patient presents at the request of Dr. Montes for a preoperative evaluation prior to arthroscopic repair of left meniscus on  02/02/2022. MRI of the left knee revealed a linear signal which extends to the superior and inferior articular surfaces and blunting of the radial margin of the root of the posterior horn consistent with a meniscal tear.  There is abnormal signal in the posterior horn which extends to the inferior articular surface and possibly the superior articular surface consistent with a meniscal tear; this extends to the junction with the meniscal body.  The anterior horn appears intact. The lateral meniscus is intact. Patient with pain. Affecting ability to walk or exercise. Knee gives out. No swelling and no redness of knee. Not taking any blood thinners, aspirin, or fish oil. No history of cardiac disease, diabetes, hypertension, or stroke. No history of deep venous thrombosis or obstructive sleep apnea. EKG and blood work not required. The patient may proceed with the proposed surgical procedure.

## 2022-01-06 NOTE — PROGRESS NOTES
Rodger Cuba MD    [facility]  [address], [address]  [place] [address]  Phone: [phone]  Fax: [phone]     History of Present Illness     Subjective     Patient ID: Dru Salazar is a 56 y.o. male.    The patient presents at the request of Dr. Montes for a preoperative evaluation prior to arthroscopic repair of left meniscus on  02/02/2022. MRI of the left knee revealed a linear signal which extends to the superior and inferior articular surfaces and blunting of the radial margin of the root of the posterior horn consistent with a meniscal tear.  There is abnormal signal in the posterior horn which extends to the inferior articular surface and possibly the superior articular surface consistent with a meniscal tear; this extends to the junction with the meniscal body.  The anterior horn appears intact. The lateral meniscus is intact. Patient with pain. Affecting ability to walk or exercise. Knee gives out. No swelling and no redness of knee. Not taking any blood thinners, aspirin, or fish oil. No history of cardiac disease, diabetes, hypertension, or stroke. No history of deep venous thrombosis or obstructive sleep apnea.         Past Medical/Surgical/Family/Social History       The following have been reviewed and updated as appropriate in this visit:   Allergies  Meds  Problems         Past Medical History:   Diagnosis Date   • Cervical spondylosis 5/11/2020    MRI in 03/2018 with Mild multilevel degenerative disc, uncovertebral and facet joint disease with mild central canal and foraminal stenosis from C4-5 to C6-7        Past Surgical History:   Procedure Laterality Date   • WISDOM TOOTH EXTRACTION         Family History   Problem Relation Age of Onset   • Arthritis Biological Mother    • Heart disease Biological Father         CAD   • Diabetes Biological Father    • Hyperlipidemia Biological Father    • Schizophrenia Biological Sister    • No Known  Problems Biological Son    • No Known Problems Biological Daughter        Social History     Tobacco Use   • Smoking status: Never Smoker   • Smokeless tobacco: Never Used   Vaping Use   • Vaping Use: Never used   Substance Use Topics   • Alcohol use: Yes     Alcohol/week: 1.0 standard drink     Types: 1 Standard drinks or equivalent per week   • Drug use: Not Currently       Patient Care Team:  Rodger Cuba MD as PCP - General (Family Medicine)  Randall Noble MD as Consulting Physician (Gastroenterology)       Allergies and Medications       Allergies   Allergen Reactions   • Sulfa (Sulfonamide Antibiotics) Hives         No current outpatient medications on file.     No current facility-administered medications for this visit.          Review of Systems       Review of Systems   Constitutional: Negative for appetite change, chills, fatigue, fever and unexpected weight change.   HENT: Negative for congestion, ear pain, hearing loss, nosebleeds, postnasal drip, rhinorrhea, sinus pressure, sinus pain, sneezing, sore throat, tinnitus and trouble swallowing.    Eyes: Negative for pain, discharge, redness and visual disturbance.   Respiratory: Negative for cough, chest tightness, shortness of breath and wheezing.    Cardiovascular: Negative for chest pain and leg swelling.   Gastrointestinal: Negative for abdominal pain, blood in stool, constipation, diarrhea, nausea and vomiting.   Endocrine: Negative for cold intolerance and heat intolerance.   Genitourinary: Negative for decreased urine volume, difficulty urinating, dysuria, flank pain, frequency, hematuria and urgency.   Musculoskeletal: Negative for arthralgias, back pain, gait problem, myalgias, neck pain and neck stiffness.   Skin: Negative for rash.   Allergic/Immunologic: Negative for environmental allergies.   Neurological: Negative for dizziness, syncope, speech difficulty, weakness, light-headedness, numbness and headaches.   Hematological: Does not  "bruise/bleed easily.   Psychiatric/Behavioral: Negative for behavioral problems, confusion, dysphoric mood and sleep disturbance. The patient is not nervous/anxious.         Physical Examination       Objective     Vitals:    01/06/22 0920   BP: 112/78   Pulse: 84   Resp: 16   Temp: 36.7 °C (98 °F)   SpO2: 98%       Pulse Readings from Last 5 Encounters:   01/06/22 84   01/11/21 65       Wt Readings from Last 5 Encounters:   01/06/22 81.6 kg (180 lb)   01/11/21 77.2 kg (170 lb 3.2 oz)       Ht Readings from Last 5 Encounters:   01/06/22 1.791 m (5' 10.5\")   01/11/21 1.778 m (5' 10\")       BP Readings from Last 5 Encounters:   01/06/22 112/78   01/11/21 112/78       BMI Readings from Last 4 Encounters:   01/06/22 25.46 kg/m²   01/11/21 24.42 kg/m²       Physical Exam  Constitutional:       General: He is not in acute distress.     Appearance: Normal appearance. He is well-developed. He is not ill-appearing, toxic-appearing or diaphoretic.   HENT:      Head: Normocephalic and atraumatic.      Right Ear: Tympanic membrane, ear canal and external ear normal.      Left Ear: Tympanic membrane, ear canal and external ear normal.      Nose: No congestion or rhinorrhea.      Mouth/Throat:      Pharynx: No oropharyngeal exudate or posterior oropharyngeal erythema.   Eyes:      General: Lids are normal.      Conjunctiva/sclera: Conjunctivae normal.      Pupils: Pupils are equal, round, and reactive to light.   Neck:      Thyroid: No thyromegaly.      Vascular: No carotid bruit.   Cardiovascular:      Rate and Rhythm: Normal rate and regular rhythm.      Pulses:           Popliteal pulses are 2+ on the right side and 2+ on the left side.        Dorsalis pedis pulses are 2+ on the right side and 2+ on the left side.      Heart sounds: Normal heart sounds. No murmur heard.    No gallop.   Pulmonary:      Effort: Pulmonary effort is normal. No tachypnea, accessory muscle usage or respiratory distress.      Breath sounds: Normal " breath sounds. No decreased breath sounds, wheezing, rhonchi or rales.   Abdominal:      General: Bowel sounds are normal. There is no distension.      Palpations: Abdomen is soft.      Tenderness: There is no abdominal tenderness. There is no guarding or rebound.      Hernia: No hernia is present.   Musculoskeletal:      Cervical back: Full passive range of motion without pain, normal range of motion and neck supple.      Right lower leg: No edema.      Left lower leg: No edema.   Lymphadenopathy:      Cervical: No cervical adenopathy.   Skin:     General: Skin is warm and dry.      Findings: No rash.   Neurological:      Mental Status: He is alert.   Psychiatric:         Mood and Affect: Mood normal. Mood is not anxious or depressed.         Speech: Speech normal.         Behavior: Behavior normal. Behavior is cooperative.         Thought Content: Thought content normal.         Judgment: Judgment normal.          Laboratory Results     Lab Results   Component Value Date    WBC 6.5 01/11/2021    HGB 14.4 01/11/2021    HCT 42.5 01/11/2021    MCV 84 01/11/2021    PLT CANCELED 01/11/2021        Lab Results   Component Value Date    GLUCOSE 88 01/11/2021     01/11/2021    K 3.9 01/11/2021    CO2 26 01/11/2021     01/11/2021    BUN 12 01/11/2021    CREATININE 0.95 01/11/2021    ALKPHOS 62 01/11/2021    AST 21 01/11/2021    ALT 16 01/11/2021    BILITOT 0.3 01/11/2021    ALBUMIN 4.4 01/11/2021       Lab Results   Component Value Date    CHOL 177 01/11/2021     Lab Results   Component Value Date    HDL 52 01/11/2021     Lab Results   Component Value Date    LDLCALC 100 (H) 01/11/2021     Lab Results   Component Value Date    TRIG 141 01/11/2021       The 10-year ASCVD risk score (Pico Riveramilo OVIEDO Jr., et al., 2013) is: 3.4%    Values used to calculate the score:      Age: 56 years      Sex: Male      Is Non- : No      Diabetic: No      Tobacco smoker: No      Systolic Blood Pressure: 112 mmHg       Is BP treated: No      HDL Cholesterol: 52 mg/dL      Total Cholesterol: 177 mg/dL    Lab Results   Component Value Date    TSH 0.707 01/11/2021       Lab Results   Component Value Date    HGBA1C 5.4 01/11/2021       Lab Results   Component Value Date    PSA 1.3 01/11/2021     No results found for: HEPCAB    Immunization History   Administered Date(s) Administered   • Influenza Vaccine Quadrivalent 3 Yr And Older 11/02/2021   • Influenza Vaccine Quadrivalent Preservative Free 6 Mons and Up 08/28/2019   • Influenza Vaccine Quadrivalent Preservative Free 6-35 Months 02/19/2018   • Influenza, Quadrivalent 09/19/2020   • Sars-cov-2 (Covid-19) Vaccine, Pfizer 03/14/2021, 04/01/2021, 11/02/2021   • Tdap 01/11/2021   • Zoster Vaccine Recombinant Adjuvanted (Shingrix) 01/11/2021     Health Maintenance Topics with due status: Overdue       Topic Date Due    Hepatitis C Screening Never done    Colonoscopy Never done    Zoster Vaccine 03/08/2021     Health Maintenance Topics with due status: Not Due       Topic Last Completion Date    DTaP, Tdap, and Td Vaccines 01/11/2021     Health Maintenance Topics with due status: Completed       Topic Last Completion Date    HIV Screening 01/11/2021    Influenza Vaccine 11/02/2021    COVID-19 Vaccine 11/02/2021     Health Maintenance Topics with due status: Aged Out       Topic Date Due    Meningococcal ACWY Aged Out    HIB Vaccines Aged Out    IPV Vaccines Aged Out    HPV Vaccines Aged Out    Pneumococcal Aged Out        Assessment and Plan       Assessment/Plan     Problem List Items Addressed This Visit     Preop examination - Primary    Current Assessment & Plan     The patient presents at the request of Dr. Montes for a preoperative evaluation prior to arthroscopic repair of left meniscus on  02/02/2022. MRI of the left knee revealed a linear signal which extends to the superior and inferior articular surfaces and blunting of the radial margin of the root of the posterior horn  consistent with a meniscal tear.  There is abnormal signal in the posterior horn which extends to the inferior articular surface and possibly the superior articular surface consistent with a meniscal tear; this extends to the junction with the meniscal body.  The anterior horn appears intact. The lateral meniscus is intact. Patient with pain. Affecting ability to walk or exercise. Knee gives out. No swelling and no redness of knee. Not taking any blood thinners, aspirin, or fish oil. No history of cardiac disease, diabetes, hypertension, or stroke. No history of deep venous thrombosis or obstructive sleep apnea. EKG and blood work not required. The patient may proceed with the proposed surgical procedure.                Return if symptoms worsen or fail to improve.    No orders of the defined types were placed in this encounter.             Rodger Cuba MD    1/6/2022

## 2022-01-08 LAB
ALBUMIN SERPL-MCNC: 4.4 G/DL (ref 3.8–4.9)
ALBUMIN/GLOB SERPL: 1.5 {RATIO} (ref 1.2–2.2)
ALP SERPL-CCNC: 66 IU/L (ref 44–121)
ALT SERPL-CCNC: 41 IU/L (ref 0–44)
AST SERPL-CCNC: 31 IU/L (ref 0–40)
BASOPHILS # BLD AUTO: 0 X10E3/UL (ref 0–0.2)
BASOPHILS NFR BLD AUTO: 1 %
BILIRUB SERPL-MCNC: 0.4 MG/DL (ref 0–1.2)
BUN SERPL-MCNC: 12 MG/DL (ref 6–24)
BUN/CREAT SERPL: 12 (ref 9–20)
CALCIUM SERPL-MCNC: 9.4 MG/DL (ref 8.7–10.2)
CHLORIDE SERPL-SCNC: 100 MMOL/L (ref 96–106)
CHOLEST SERPL-MCNC: 207 MG/DL (ref 100–199)
CO2 SERPL-SCNC: 25 MMOL/L (ref 20–29)
CREAT SERPL-MCNC: 1.01 MG/DL (ref 0.76–1.27)
EOSINOPHIL # BLD AUTO: 0.1 X10E3/UL (ref 0–0.4)
EOSINOPHIL NFR BLD AUTO: 2 %
ERYTHROCYTE [DISTWIDTH] IN BLOOD BY AUTOMATED COUNT: 12.5 % (ref 11.6–15.4)
GLOBULIN SER CALC-MCNC: 2.9 G/DL (ref 1.5–4.5)
GLUCOSE SERPL-MCNC: 124 MG/DL (ref 65–99)
HCT VFR BLD AUTO: 42.9 % (ref 37.5–51)
HCV AB S/CO SERPL IA: <0.1 S/CO RATIO (ref 0–0.9)
HDLC SERPL-MCNC: 49 MG/DL
HGB BLD-MCNC: 14.5 G/DL (ref 13–17.7)
IMM GRANULOCYTES # BLD AUTO: 0 X10E3/UL (ref 0–0.1)
IMM GRANULOCYTES NFR BLD AUTO: 0 %
LAB CORP EGFR IF AFRICN AM: 97 ML/MIN/1.73
LAB CORP EGFR IF NONAFRICN AM: 84 ML/MIN/1.73
LDLC SERPL CALC-MCNC: 125 MG/DL (ref 0–99)
LYMPHOCYTES # BLD AUTO: 2.6 X10E3/UL (ref 0.7–3.1)
LYMPHOCYTES NFR BLD AUTO: 42 %
MCH RBC QN AUTO: 28 PG (ref 26.6–33)
MCHC RBC AUTO-ENTMCNC: 33.8 G/DL (ref 31.5–35.7)
MCV RBC AUTO: 83 FL (ref 79–97)
MONOCYTES # BLD AUTO: 0.6 X10E3/UL (ref 0.1–0.9)
MONOCYTES NFR BLD AUTO: 9 %
MORPHOLOGY BLD-IMP: NORMAL
NEUTROPHILS # BLD AUTO: 2.8 X10E3/UL (ref 1.4–7)
NEUTROPHILS NFR BLD AUTO: 46 %
PLATELET # BLD AUTO: NORMAL X10E3/UL
POTASSIUM SERPL-SCNC: 4.2 MMOL/L (ref 3.5–5.2)
PROT SERPL-MCNC: 7.3 G/DL (ref 6–8.5)
PSA SERPL-MCNC: 1.1 NG/ML (ref 0–4)
RBC # BLD AUTO: 5.18 X10E6/UL (ref 4.14–5.8)
SODIUM SERPL-SCNC: 137 MMOL/L (ref 134–144)
TRIGL SERPL-MCNC: 188 MG/DL (ref 0–149)
TSH SERPL DL<=0.005 MIU/L-ACNC: 0.79 UIU/ML (ref 0.45–4.5)
VLDLC SERPL CALC-MCNC: 33 MG/DL (ref 5–40)
WBC # BLD AUTO: 6.1 X10E3/UL (ref 3.4–10.8)

## 2022-01-11 ENCOUNTER — TELEPHONE (OUTPATIENT)
Dept: PRIMARY CARE | Facility: CLINIC | Age: 57
End: 2022-01-11

## 2022-01-11 NOTE — TELEPHONE ENCOUNTER
· I called the patient regarding his physical scheduled he said that he has surgery tomorrow and he wasn't aware of the appointment I informed him the appointment was scheduled January 11, 2021 he will not be in today .

## 2022-01-20 LAB
HCV AB S/CO SERPL IA: <0.1 S/CO RATIO (ref 0–0.9)
HIV 1+2 AB+HIV1 P24 AG SERPL QL IA: NON REACTIVE
HSV1+2 IGM SER IA-ACNC: <0.91 RATIO (ref 0–0.9)

## 2022-01-26 ENCOUNTER — TELEPHONE (OUTPATIENT)
Dept: PRIMARY CARE | Facility: CLINIC | Age: 57
End: 2022-01-26
Payer: COMMERCIAL

## 2022-01-26 NOTE — TELEPHONE ENCOUNTER
Pt had lab work done at [facility] last week, however he can only see the results to two of the test. PT is asking that you reach out to them and find out what's going on with the rest of the results.

## 2022-01-26 NOTE — TELEPHONE ENCOUNTER
· I called the patient informed him that it takes time for the labs to come through   · And that the Dr reviewed the labs and they are stable will review at his upcoming appointment .

## 2022-01-28 ENCOUNTER — OFFICE VISIT (OUTPATIENT)
Dept: PRIMARY CARE | Facility: CLINIC | Age: 57
End: 2022-01-28
Payer: COMMERCIAL

## 2022-01-28 VITALS
DIASTOLIC BLOOD PRESSURE: 78 MMHG | WEIGHT: 178.6 LBS | OXYGEN SATURATION: 92 % | HEART RATE: 76 BPM | TEMPERATURE: 97.5 F | BODY MASS INDEX: 25 KG/M2 | SYSTOLIC BLOOD PRESSURE: 118 MMHG | HEIGHT: 71 IN | RESPIRATION RATE: 16 BRPM

## 2022-01-28 DIAGNOSIS — Z12.11 COLON CANCER SCREENING: Primary | ICD-10-CM

## 2022-01-28 DIAGNOSIS — Z00.00 ENCOUNTER FOR GENERAL ADULT MEDICAL EXAMINATION WITHOUT ABNORMAL FINDINGS: ICD-10-CM

## 2022-01-28 PROCEDURE — 99396 PREV VISIT EST AGE 40-64: CPT | Performed by: FAMILY MEDICINE

## 2022-01-28 PROCEDURE — 3008F BODY MASS INDEX DOCD: CPT | Performed by: FAMILY MEDICINE

## 2022-01-28 SDOH — ECONOMIC STABILITY: TRANSPORTATION INSECURITY
IN THE PAST 12 MONTHS, HAS LACK OF TRANSPORTATION KEPT YOU FROM MEETINGS, WORK, OR FROM GETTING THINGS NEEDED FOR DAILY LIVING?: NO

## 2022-01-28 SDOH — HEALTH STABILITY: PHYSICAL HEALTH: ON AVERAGE, HOW MANY DAYS PER WEEK DO YOU ENGAGE IN MODERATE TO STRENUOUS EXERCISE (LIKE A BRISK WALK)?: 3 DAYS

## 2022-01-28 SDOH — HEALTH STABILITY: PHYSICAL HEALTH: ON AVERAGE, HOW MANY MINUTES DO YOU ENGAGE IN EXERCISE AT THIS LEVEL?: 40 MIN

## 2022-01-28 SDOH — ECONOMIC STABILITY: FOOD INSECURITY: WITHIN THE PAST 12 MONTHS, THE FOOD YOU BOUGHT JUST DIDN'T LAST AND YOU DIDN'T HAVE MONEY TO GET MORE.: NEVER TRUE

## 2022-01-28 SDOH — HEALTH STABILITY: PHYSICAL HEALTH: ON AVERAGE, HOW MANY MINUTES DO YOU ENGAGE IN EXERCISE AT THIS LEVEL?: 50 MIN

## 2022-01-28 SDOH — ECONOMIC STABILITY: FOOD INSECURITY: WITHIN THE PAST 12 MONTHS, YOU WORRIED THAT YOUR FOOD WOULD RUN OUT BEFORE YOU GOT MONEY TO BUY MORE.: NEVER TRUE

## 2022-01-28 SDOH — ECONOMIC STABILITY: TRANSPORTATION INSECURITY
IN THE PAST 12 MONTHS, HAS THE LACK OF TRANSPORTATION KEPT YOU FROM MEDICAL APPOINTMENTS OR FROM GETTING MEDICATIONS?: NO

## 2022-01-28 ASSESSMENT — ENCOUNTER SYMPTOMS
DYSURIA: 0
NECK PAIN: 0
COUGH: 0
NECK STIFFNESS: 0
DIARRHEA: 0
ARTHRALGIAS: 0
SLEEP DISTURBANCE: 0
FLANK PAIN: 0
TROUBLE SWALLOWING: 0
FEVER: 0
EYE REDNESS: 0
SINUS PAIN: 0
NAUSEA: 0
SORE THROAT: 0
RHINORRHEA: 0
APPETITE CHANGE: 0
FREQUENCY: 0
CONFUSION: 0
EYE PAIN: 0
SHORTNESS OF BREATH: 0
LIGHT-HEADEDNESS: 0
BLOOD IN STOOL: 0
CONSTIPATION: 0
NERVOUS/ANXIOUS: 0
CHEST TIGHTNESS: 0
MYALGIAS: 0
DIZZINESS: 0
NUMBNESS: 0
HEADACHES: 0
FATIGUE: 0
SINUS PRESSURE: 0
DYSPHORIC MOOD: 0
ABDOMINAL PAIN: 0
UNEXPECTED WEIGHT CHANGE: 0
DIFFICULTY URINATING: 0
SPEECH DIFFICULTY: 0
HEMATURIA: 0
BACK PAIN: 0
WHEEZING: 0
BRUISES/BLEEDS EASILY: 0
VOMITING: 0
CHILLS: 0
EYE DISCHARGE: 0
WEAKNESS: 0

## 2022-01-28 ASSESSMENT — LIFESTYLE VARIABLES
HOW OFTEN DO YOU HAVE A DRINK CONTAINING ALCOHOL: 2-4 TIMES A MONTH
HOW MANY STANDARD DRINKS CONTAINING ALCOHOL DO YOU HAVE ON A TYPICAL DAY: 1 OR 2
HOW OFTEN DO YOU HAVE SIX OR MORE DRINKS ON ONE OCCASION: NEVER

## 2022-01-28 ASSESSMENT — SOCIAL DETERMINANTS OF HEALTH (SDOH): HOW HARD IS IT FOR YOU TO PAY FOR THE VERY BASICS LIKE FOOD, HOUSING, MEDICAL CARE, AND HEATING?: NOT HARD AT ALL

## 2022-01-28 NOTE — PATIENT INSTRUCTIONS
Problem List Items Addressed This Visit     Encounter for general adult medical examination without abnormal findings - Primary     · The patient is currently stable.   · Bloodwork was ordered.  · Further recommendations will be provided to the patient based on the results of the tests.   · The patient should  exercise at 70 percent of his maximal heart rate for 2.5 hours per week.   · All the recommend vaccinations are up to date. Patient wants to defer Shingrix vaccine to future date.  · The patient should be evaluated by the ophthalmologist every 1-2 years and dentist every 3 months.   · The patient was advised to protect the skin by applying suntan lotion containing an SPF > 30 every 2 hours while in sun or after swimming. Instructed to avoid prolonged direct sun exposure as much as possible.   · The patient's body mass index is top normal.   · The patient was given stool FIT test today. Still recommend colonoscopy in future.  · Advised to consume 1000 mg of calcium daily through the diet. If the patient is unable to consume 1000 mg through the diet, then taking calcium supplements is an acceptable alternative. The patient was informed not to consume more than 500 mg of a calcium supplement at a time.              Relevant Orders    Hemoglobin A1c    Lipid panel    Basic metabolic panel    Testosterone, free, total          Patient Education     Health Maintenance, Male  Adopting a healthy lifestyle and getting preventive care are important in promoting health and wellness. Ask your health care provider about:  · The right schedule for you to have regular tests and exams.  · Things you can do on your own to prevent diseases and keep yourself healthy.  What should I know about diet, weight, and exercise?  Eat a healthy diet    · Eat a diet that includes plenty of vegetables, fruits, low-fat dairy products, and lean protein.  · Do not eat a lot of foods that are high in solid fats, added sugars, or  sodium.  Maintain a healthy weight  Body mass index (BMI) is a measurement that can be used to identify possible weight problems. It estimates body fat based on height and weight. Your health care provider can help determine your BMI and help you achieve or maintain a healthy weight.  Get regular exercise  Get regular exercise. This is one of the most important things you can do for your health. Most adults should:  · Exercise for at least 150 minutes each week. The exercise should increase your heart rate and make you sweat (moderate-intensity exercise).  · Do strengthening exercises at least twice a week. This is in addition to the moderate-intensity exercise.  · Spend less time sitting. Even light physical activity can be beneficial.  Watch cholesterol and blood lipids  Have your blood tested for lipids and cholesterol at 20 years of age, then have this test every 5 years.  You may need to have your cholesterol levels checked more often if:  · Your lipid or cholesterol levels are high.  · You are older than 40 years of age.  · You are at high risk for heart disease.  What should I know about cancer screening?  Many types of cancers can be detected early and may often be prevented. Depending on your health history and family history, you may need to have cancer screening at various ages. This may include screening for:  · Colorectal cancer.  · Prostate cancer.  · Skin cancer.  · Lung cancer.  What should I know about heart disease, diabetes, and high blood pressure?  Blood pressure and heart disease  · High blood pressure causes heart disease and increases the risk of stroke. This is more likely to develop in people who have high blood pressure readings, are of  descent, or are overweight.  · Talk with your health care provider about your target blood pressure readings.  · Have your blood pressure checked:  ? Every 3-5 years if you are 18-39 years of age.  ? Every year if you are 40 years old or  older.  · If you are between the ages of 65 and 75 and are a current or former smoker, ask your health care provider if you should have a one-time screening for abdominal aortic aneurysm (AAA).  Diabetes  Have regular diabetes screenings. This checks your fasting blood sugar level. Have the screening done:  · Once every three years after age 45 if you are at a normal weight and have a low risk for diabetes.  · More often and at a younger age if you are overweight or have a high risk for diabetes.  What should I know about preventing infection?  Hepatitis B  If you have a higher risk for hepatitis B, you should be screened for this virus. Talk with your health care provider to find out if you are at risk for hepatitis B infection.  Hepatitis C  Blood testing is recommended for:  · Everyone born from 1945 through 1965.  · Anyone with known risk factors for hepatitis C.  Sexually transmitted infections (STIs)  · You should be screened each year for STIs, including gonorrhea and chlamydia, if:  ? You are sexually active and are younger than 24 years of age.  ? You are older than 24 years of age and your health care provider tells you that you are at risk for this type of infection.  ? Your sexual activity has changed since you were last screened, and you are at increased risk for chlamydia or gonorrhea. Ask your health care provider if you are at risk.  · Ask your health care provider about whether you are at high risk for HIV. Your health care provider may recommend a prescription medicine to help prevent HIV infection. If you choose to take medicine to prevent HIV, you should first get tested for HIV. You should then be tested every 3 months for as long as you are taking the medicine.  Follow these instructions at home:  Lifestyle  · Do not use any products that contain nicotine or tobacco, such as cigarettes, e-cigarettes, and chewing tobacco. If you need help quitting, ask your health care provider.  · Do not use  street drugs.  · Do not share needles.  · Ask your health care provider for help if you need support or information about quitting drugs.  Alcohol use  · Do not drink alcohol if your health care provider tells you not to drink.  · If you drink alcohol:  ? Limit how much you have to 0-2 drinks a day.  ? Be aware of how much alcohol is in your drink. In the [place] one drink equals one 12 oz bottle of beer (355 mL), one 5 oz glass of wine (148 mL), or one 1½ oz glass of hard liquor (44 mL).  General instructions  · Schedule regular health, dental, and eye exams.  · Stay current with your vaccines.  · Tell your health care provider if:  ? You often feel depressed.  ? You have ever been abused or do not feel safe at home.  Summary  · Adopting a healthy lifestyle and getting preventive care are important in promoting health and wellness.  · Follow your health care provider's instructions about healthy diet, exercising, and getting tested or screened for diseases.  · Follow your health care provider's instructions on monitoring your cholesterol and blood pressure.  This information is not intended to replace advice given to you by your health care provider. Make sure you discuss any questions you have with your health care provider.  Document Revised: 12/11/2019 Document Reviewed: 12/11/2019  Elsevier Patient Education © 2021 [employer]

## 2022-01-28 NOTE — ASSESSMENT & PLAN NOTE
· The patient is currently stable.   · Bloodwork was ordered.  · Further recommendations will be provided to the patient based on the results of the tests.   · The patient should  exercise at 70 percent of his maximal heart rate for 2.5 hours per week.   · All the recommend vaccinations are up to date. Patient wants to defer Shingrix vaccine to future date.  · The patient should be evaluated by the ophthalmologist every 1-2 years and dentist every 3 months.   · The patient was advised to protect the skin by applying suntan lotion containing an SPF > 30 every 2 hours while in sun or after swimming. Instructed to avoid prolonged direct sun exposure as much as possible.   · The patient's body mass index is top normal.   · The patient was given stool FIT test today. Still recommend colonoscopy in future.  · Advised to consume 1000 mg of calcium daily through the diet. If the patient is unable to consume 1000 mg through the diet, then taking calcium supplements is an acceptable alternative. The patient was informed not to consume more than 500 mg of a calcium supplement at a time.

## 2022-01-28 NOTE — PROGRESS NOTES
Rodger Cuba MD    [facility]  [address], [address]  [place] [address]  Phone: [phone]  Fax: [phone]     History of Present Illness     Subjective     Patient ID: Dru Salazar is a 56 y.o. male.    The patient present for an annual preventative visit.         Past Medical/Surgical/Family/Social History       The following have been reviewed and updated as appropriate in this visit:   Allergies  Meds  Problems         Past Medical History:   Diagnosis Date   • Cervical spondylosis 5/11/2020    MRI in 03/2018 with Mild multilevel degenerative disc, uncovertebral and facet joint disease with mild central canal and foraminal stenosis from C4-5 to C6-7        Past Surgical History:   Procedure Laterality Date   • ROTATOR CUFF REPAIR Left 01/12/2022   • WISDOM TOOTH EXTRACTION         Family History   Problem Relation Age of Onset   • Arthritis Biological Mother    • Heart disease Biological Father         CAD   • Diabetes Biological Father    • Hyperlipidemia Biological Father    • Schizophrenia Biological Sister    • No Known Problems Biological Son    • No Known Problems Biological Daughter        Social History     Tobacco Use   • Smoking status: Never Smoker   • Smokeless tobacco: Never Used   Vaping Use   • Vaping Use: Never used   Substance Use Topics   • Alcohol use: Yes     Alcohol/week: 1.0 standard drink     Types: 1 Standard drinks or equivalent per week   • Drug use: Not Currently       Patient Care Team:  Rodger Cuba MD as PCP - General (Family Medicine)  Randall Nobel MD as Consulting Physician (Gastroenterology)       Allergies and Medications       Allergies   Allergen Reactions   • Sulfa (Sulfonamide Antibiotics) Hives         No current outpatient medications on file.     No current facility-administered medications for this visit.          Review of Systems       Review of Systems   Constitutional: Negative for appetite change, chills,  "fatigue, fever and unexpected weight change.   HENT: Negative for congestion, ear pain, hearing loss, nosebleeds, postnasal drip, rhinorrhea, sinus pressure, sinus pain, sneezing, sore throat, tinnitus and trouble swallowing.    Eyes: Negative for pain, discharge, redness and visual disturbance.   Respiratory: Negative for cough, chest tightness, shortness of breath and wheezing.    Cardiovascular: Negative for chest pain and leg swelling.   Gastrointestinal: Negative for abdominal pain, blood in stool, constipation, diarrhea, nausea and vomiting.   Endocrine: Negative for cold intolerance and heat intolerance.   Genitourinary: Negative for decreased urine volume, difficulty urinating, dysuria, flank pain, frequency, hematuria and urgency.   Musculoskeletal: Negative for arthralgias, back pain, gait problem, myalgias, neck pain and neck stiffness.   Skin: Negative for rash.   Allergic/Immunologic: Negative for environmental allergies.   Neurological: Negative for dizziness, syncope, speech difficulty, weakness, light-headedness, numbness and headaches.   Hematological: Does not bruise/bleed easily.   Psychiatric/Behavioral: Negative for behavioral problems, confusion, dysphoric mood and sleep disturbance. The patient is not nervous/anxious.         Physical Examination       Objective     Vitals:    01/28/22 0907   BP: 118/78   Pulse: 76   Resp: 16   Temp: 36.4 °C (97.5 °F)   SpO2: 92%       Pulse Readings from Last 5 Encounters:   01/28/22 76   01/06/22 84   01/11/21 65       Wt Readings from Last 5 Encounters:   01/28/22 81 kg (178 lb 9.6 oz)   01/06/22 81.6 kg (180 lb)   01/11/21 77.2 kg (170 lb 3.2 oz)       Ht Readings from Last 5 Encounters:   01/28/22 1.791 m (5' 10.5\")   01/06/22 1.791 m (5' 10.5\")   01/11/21 1.778 m (5' 10\")       BP Readings from Last 5 Encounters:   01/28/22 118/78   01/06/22 112/78   01/11/21 112/78       BMI Readings from Last 4 Encounters:   01/28/22 25.26 kg/m²   01/06/22 25.46 kg/m² "   01/11/21 24.42 kg/m²       Physical Exam  Constitutional:       General: He is not in acute distress.     Appearance: Normal appearance. He is well-developed. He is not ill-appearing, toxic-appearing or diaphoretic.   HENT:      Head: Normocephalic and atraumatic.      Right Ear: Tympanic membrane, ear canal and external ear normal.      Left Ear: Tympanic membrane, ear canal and external ear normal.      Nose: No congestion or rhinorrhea.      Mouth/Throat:      Pharynx: No oropharyngeal exudate or posterior oropharyngeal erythema.   Eyes:      General: Lids are normal.      Conjunctiva/sclera: Conjunctivae normal.      Pupils: Pupils are equal, round, and reactive to light.   Neck:      Thyroid: No thyromegaly.      Vascular: No carotid bruit.   Cardiovascular:      Rate and Rhythm: Normal rate and regular rhythm.      Pulses:           Popliteal pulses are 2+ on the right side and 2+ on the left side.        Dorsalis pedis pulses are 2+ on the right side and 2+ on the left side.      Heart sounds: Normal heart sounds. No murmur heard.    No gallop.   Pulmonary:      Effort: Pulmonary effort is normal. No tachypnea, accessory muscle usage or respiratory distress.      Breath sounds: Normal breath sounds. No decreased breath sounds, wheezing, rhonchi or rales.   Abdominal:      General: Bowel sounds are normal. There is no distension.      Palpations: Abdomen is soft.      Tenderness: There is no abdominal tenderness. There is no guarding or rebound.      Hernia: No hernia is present.   Musculoskeletal:      Cervical back: Full passive range of motion without pain, normal range of motion and neck supple.      Right lower leg: No edema.      Left lower leg: No edema.   Lymphadenopathy:      Cervical: No cervical adenopathy.   Skin:     General: Skin is warm and dry.      Findings: No rash.   Neurological:      Mental Status: He is alert.   Psychiatric:         Mood and Affect: Mood normal. Mood is not anxious or  depressed.         Speech: Speech normal.         Behavior: Behavior normal. Behavior is cooperative.         Thought Content: Thought content normal.         Judgment: Judgment normal.          Laboratory Results     Lab Results   Component Value Date    WBC 6.1 01/07/2022    WBC 6.5 01/11/2021    HGB 14.5 01/07/2022    HGB 14.4 01/11/2021    HCT 42.9 01/07/2022    HCT 42.5 01/11/2021    MCV 83 01/07/2022    MCV 84 01/11/2021    PLT CANCELED 01/07/2022    PLT CANCELED 01/11/2021        Lab Results   Component Value Date    GLUCOSE 124 (H) 01/07/2022    GLUCOSE 88 01/11/2021     01/07/2022     01/11/2021    K 4.2 01/07/2022    K 3.9 01/11/2021    CO2 25 01/07/2022    CO2 26 01/11/2021     01/07/2022     01/11/2021    BUN 12 01/07/2022    BUN 12 01/11/2021    CREATININE 1.01 01/07/2022    CREATININE 0.95 01/11/2021    ALKPHOS 66 01/07/2022    ALKPHOS 62 01/11/2021    AST 31 01/07/2022    AST 21 01/11/2021    ALT 41 01/07/2022    ALT 16 01/11/2021    BILITOT 0.4 01/07/2022    BILITOT 0.3 01/11/2021    ALBUMIN 4.4 01/07/2022    ALBUMIN 4.4 01/11/2021       Lab Results   Component Value Date    CHOL 207 (H) 01/07/2022    CHOL 177 01/11/2021     Lab Results   Component Value Date    HDL 49 01/07/2022    HDL 52 01/11/2021     Lab Results   Component Value Date    LDLCALC 125 (H) 01/07/2022    LDLCALC 100 (H) 01/11/2021     Lab Results   Component Value Date    TRIG 188 (H) 01/07/2022    TRIG 141 01/11/2021       The 10-year ASCVD risk score (Magy OVIEDO Jr., et al., 2013) is: 4.7%    Values used to calculate the score:      Age: 56 years      Sex: Male      Is Non- : No      Diabetic: No      Tobacco smoker: No      Systolic Blood Pressure: 118 mmHg      Is BP treated: No      HDL Cholesterol: 49 mg/dL      Total Cholesterol: 207 mg/dL    Lab Results   Component Value Date    TSH 0.786 01/07/2022    TSH 0.707 01/11/2021       Lab Results   Component Value Date    HGBA1C 5.4  01/11/2021         Lab Results   Component Value Date    PSA 1.1 01/07/2022    PSA 1.3 01/11/2021     Lab Results   Component Value Date    HEPCAB <0.1 01/19/2022       Immunization History   Administered Date(s) Administered   • Influenza Vaccine Quadrivalent 3 Yr And Older 11/02/2021   • Influenza Vaccine Quadrivalent Preservative Free 6 Mons and Up 08/28/2019   • Influenza Vaccine Quadrivalent Preservative Free 6-35 Months 02/19/2018   • Influenza, Quadrivalent 09/19/2020   • Sars-cov-2 (Covid-19) Vaccine, Pfizer 03/14/2021, 04/01/2021, 11/02/2021   • Tdap 01/11/2021   • Zoster Vaccine Recombinant Adjuvanted (Shingrix) 01/11/2021     Health Maintenance Topics with due status: Overdue       Topic Date Due    Colonoscopy Never done    Zoster Vaccine 03/08/2021     Health Maintenance Topics with due status: Not Due       Topic Last Completion Date    DTaP, Tdap, and Td Vaccines 01/11/2021     Health Maintenance Topics with due status: Completed       Topic Last Completion Date    Influenza Vaccine 11/02/2021    COVID-19 Vaccine 11/02/2021    HIV Screening 01/19/2022    Hepatitis C Screening 01/19/2022     Health Maintenance Topics with due status: Aged Out       Topic Date Due    Meningococcal ACWY Aged Out    HIB Vaccines Aged Out    IPV Vaccines Aged Out    HPV Vaccines Aged Out    Pneumococcal Aged Out        Assessment and Plan       Assessment/Plan     Problem List Items Addressed This Visit     Encounter for general adult medical examination without abnormal findings - Primary    Current Assessment & Plan     · The patient is currently stable.   · Bloodwork was ordered.  · Further recommendations will be provided to the patient based on the results of the tests.   · The patient should  exercise at 70 percent of his maximal heart rate for 2.5 hours per week.   · All the recommend vaccinations are up to date. Patient wants to defer Shingrix vaccine to future date.  · The patient should be evaluated by the  ophthalmologist every 1-2 years and dentist every 3 months.   · The patient was advised to protect the skin by applying suntan lotion containing an SPF > 30 every 2 hours while in sun or after swimming. Instructed to avoid prolonged direct sun exposure as much as possible.   · The patient's body mass index is top normal.   · The patient was given stool FIT test today. Still recommend colonoscopy in future.  · Advised to consume 1000 mg of calcium daily through the diet. If the patient is unable to consume 1000 mg through the diet, then taking calcium supplements is an acceptable alternative. The patient was informed not to consume more than 500 mg of a calcium supplement at a time.              Relevant Orders    Hemoglobin A1c    Lipid panel    Basic metabolic panel    Testosterone, free, total        Return in about 1 year (around 1/28/2023) for Physical Exam.    Orders Placed This Encounter   Procedures   • Hemoglobin A1c     Standing Status:   Future     Number of Occurrences:   1     Standing Expiration Date:   1/28/2023     Order Specific Question:   Release to patient     Answer:   Immediate   • Lipid panel     Standing Status:   Future     Number of Occurrences:   1     Standing Expiration Date:   1/28/2023     Order Specific Question:   Release to patient     Answer:   Immediate   • Basic metabolic panel     Standing Status:   Future     Number of Occurrences:   1     Standing Expiration Date:   1/28/2023     Order Specific Question:   Release to patient     Answer:   Immediate   • Testosterone, free, total     Standing Status:   Future     Number of Occurrences:   1     Standing Expiration Date:   1/28/2023     Order Specific Question:   Release to patient     Answer:   Immediate              Rodger Cuba MD    1/28/2022

## 2022-03-10 LAB
BUN SERPL-MCNC: 20 MG/DL (ref 6–24)
BUN/CREAT SERPL: 21 (ref 9–20)
CALCIUM SERPL-MCNC: 9.4 MG/DL (ref 8.7–10.2)
CHLORIDE SERPL-SCNC: 101 MMOL/L (ref 96–106)
CHOLEST SERPL-MCNC: 195 MG/DL (ref 100–199)
CO2 SERPL-SCNC: 24 MMOL/L (ref 20–29)
CREAT SERPL-MCNC: 0.94 MG/DL (ref 0.76–1.27)
EGFRCR SERPLBLD CKD-EPI 2021: 96 ML/MIN/1.73
GLUCOSE SERPL-MCNC: 95 MG/DL (ref 65–99)
HDLC SERPL-MCNC: 52 MG/DL
LDLC SERPL CALC-MCNC: 127 MG/DL (ref 0–99)
POTASSIUM SERPL-SCNC: 4.2 MMOL/L (ref 3.5–5.2)
SODIUM SERPL-SCNC: 139 MMOL/L (ref 134–144)
TRIGL SERPL-MCNC: 87 MG/DL (ref 0–149)
VLDLC SERPL CALC-MCNC: 16 MG/DL (ref 5–40)

## 2022-03-11 LAB — HBA1C MFR BLD: 5.5 % (ref 4.8–5.6)

## 2022-03-14 ENCOUNTER — TELEPHONE (OUTPATIENT)
Dept: PRIMARY CARE | Facility: CLINIC | Age: 57
End: 2022-03-14
Payer: COMMERCIAL

## 2022-03-14 NOTE — TELEPHONE ENCOUNTER
ON CALL PHONE NOTE:  Pt called [facility] Sunday Night, Stated he had a fever of @102. Took 2 Covid tests and they were negative. Denied any other symptoms, just a fever. Instructed pt he may have a vrius/cold and to stay well hydrated, take Tepid baths or showers and can use TYL alternating with IBU as needed for the Fever. If worsens, go to the ED for eval. Pt understands the plan.

## 2022-03-16 ENCOUNTER — DOCUMENTATION (OUTPATIENT)
Dept: PRIMARY CARE | Facility: CLINIC | Age: 57
End: 2022-03-16
Payer: COMMERCIAL

## 2022-03-16 NOTE — PROGRESS NOTES
Patient notified of blood test results.  Lipids are mildly high for LDL.  Recommend low-fat diet.  Total cholesterol is normal.  No other new recommendations advised at this time.

## 2022-10-31 ENCOUNTER — OFFICE VISIT (OUTPATIENT)
Dept: URBAN - METROPOLITAN AREA SURGERY CENTER 15 | Facility: SURGERY CENTER | Age: 57
End: 2022-10-31
Payer: COMMERCIAL

## 2022-10-31 DIAGNOSIS — Z12.11 COLON CANCER SCREENING: ICD-10-CM

## 2022-10-31 PROCEDURE — 45378 DIAGNOSTIC COLONOSCOPY: CPT | Performed by: INTERNAL MEDICINE

## 2022-10-31 PROCEDURE — G8907 PT DOC NO EVENTS ON DISCHARG: HCPCS | Performed by: INTERNAL MEDICINE

## 2023-01-06 ENCOUNTER — TELEPHONE (OUTPATIENT)
Dept: PRIMARY CARE | Facility: CLINIC | Age: 58
End: 2023-01-06
Payer: COMMERCIAL

## 2023-01-06 DIAGNOSIS — Z00.00 ANNUAL PHYSICAL EXAM: Primary | ICD-10-CM

## 2023-01-06 NOTE — TELEPHONE ENCOUNTER
· I did the orders I called the patient I left a message on his cell the orders are in for the fasting blood work if he has any further questions to call the office directly .

## 2023-01-06 NOTE — TELEPHONE ENCOUNTER
Patient scheduled for physical with Dr. Cuba on 2/2 and has scheduled his blood work at [facility] on 1/18.  Asking for blood work scripts sent to the [facility] in [place] prior to 1/18 and to let him know once they get them.

## 2023-01-09 NOTE — TELEPHONE ENCOUNTER
· I called the patient to make sure he got the message he said he did I fax over both labs slips to [facility] [phone] confirmation received .

## 2023-01-19 LAB
ALBUMIN SERPL-MCNC: 4.5 G/DL (ref 3.8–4.9)
ALBUMIN/GLOB SERPL: 1.6 {RATIO} (ref 1.2–2.2)
ALP SERPL-CCNC: 73 IU/L (ref 44–121)
ALT SERPL-CCNC: 27 IU/L (ref 0–44)
AST SERPL-CCNC: 40 IU/L (ref 0–40)
BASOPHILS # BLD AUTO: 0.1 X10E3/UL (ref 0–0.2)
BASOPHILS NFR BLD AUTO: 1 %
BILIRUB SERPL-MCNC: 0.7 MG/DL (ref 0–1.2)
BUN SERPL-MCNC: 14 MG/DL (ref 6–24)
BUN/CREAT SERPL: 13 (ref 9–20)
CALCIUM SERPL-MCNC: 9.4 MG/DL (ref 8.7–10.2)
CHLORIDE SERPL-SCNC: 103 MMOL/L (ref 96–106)
CHOLEST SERPL-MCNC: 184 MG/DL (ref 100–199)
CO2 SERPL-SCNC: 23 MMOL/L (ref 20–29)
CREAT SERPL-MCNC: 1.12 MG/DL (ref 0.76–1.27)
EGFRCR SERPLBLD CKD-EPI 2021: 78 ML/MIN/1.73
EOSINOPHIL # BLD AUTO: 0.2 X10E3/UL (ref 0–0.4)
EOSINOPHIL NFR BLD AUTO: 3 %
ERYTHROCYTE [DISTWIDTH] IN BLOOD BY AUTOMATED COUNT: 13 % (ref 11.6–15.4)
GLOBULIN SER CALC-MCNC: 2.8 G/DL (ref 1.5–4.5)
GLUCOSE SERPL-MCNC: 91 MG/DL (ref 70–99)
HCT VFR BLD AUTO: 43.2 % (ref 37.5–51)
HDLC SERPL-MCNC: 51 MG/DL
HGB BLD-MCNC: 14.6 G/DL (ref 13–17.7)
IMM GRANULOCYTES # BLD AUTO: 0 X10E3/UL (ref 0–0.1)
IMM GRANULOCYTES NFR BLD AUTO: 0 %
LDLC SERPL CALC-MCNC: 114 MG/DL (ref 0–99)
LYMPHOCYTES # BLD AUTO: 2.7 X10E3/UL (ref 0.7–3.1)
LYMPHOCYTES NFR BLD AUTO: 49 %
MCH RBC QN AUTO: 28.3 PG (ref 26.6–33)
MCHC RBC AUTO-ENTMCNC: 33.8 G/DL (ref 31.5–35.7)
MCV RBC AUTO: 84 FL (ref 79–97)
MONOCYTES # BLD AUTO: 0.5 X10E3/UL (ref 0.1–0.9)
MONOCYTES NFR BLD AUTO: 10 %
MORPHOLOGY BLD-IMP: NORMAL
NEUTROPHILS # BLD AUTO: 2 X10E3/UL (ref 1.4–7)
NEUTROPHILS NFR BLD AUTO: 37 %
PLATELET # BLD AUTO: NORMAL X10E3/UL
POTASSIUM SERPL-SCNC: 4.2 MMOL/L (ref 3.5–5.2)
PROT SERPL-MCNC: 7.3 G/DL (ref 6–8.5)
PSA SERPL-MCNC: 0.9 NG/ML (ref 0–4)
RBC # BLD AUTO: 5.15 X10E6/UL (ref 4.14–5.8)
SODIUM SERPL-SCNC: 140 MMOL/L (ref 134–144)
TESTOST FREE SERPL-MCNC: 8.5 PG/ML (ref 7.2–24)
TESTOST SERPL-MCNC: 292 NG/DL (ref 264–916)
TRIGL SERPL-MCNC: 107 MG/DL (ref 0–149)
TSH SERPL DL<=0.005 MIU/L-ACNC: 0.83 UIU/ML (ref 0.45–4.5)
VLDLC SERPL CALC-MCNC: 19 MG/DL (ref 5–40)
WBC # BLD AUTO: 5.5 X10E3/UL (ref 3.4–10.8)

## 2023-01-19 NOTE — RESULT ENCOUNTER NOTE
The laboratory studies were reviewed. The findings are stable. I will review the findings with the  patient at the upcoming visit.

## 2023-10-10 NOTE — PROGRESS NOTES
Indira Matthew D.O.  [facility]  [address]  [place] [address]  Phone: [phone]  Fax: [phone]     History of Present Illness       Patient ID: Dru Salazar is a 58 y.o. male.    Subjective     HPI  58 y.o.male  presents today with the following complaints/concerns:    C/o itching on the penis with some redness the past 2-3 wks  Girlfriend recnetly had a yeast infection,  Small area or irritation has improved the past few days.  Is putting Neosporin on the rash.   No Blistering.  No penile d/c or dysuria.              Past Medical/Surgical/Family/Social History     The following have been reviewed and updated as appropriate in this visit:          Past Medical History:   Diagnosis Date    Cervical spondylosis 5/11/2020    MRI in 03/2018 with Mild multilevel degenerative disc, uncovertebral and facet joint disease with mild central canal and foraminal stenosis from C4-5 to C6-7        Past Surgical History:   Procedure Laterality Date    ROTATOR CUFF REPAIR Left 01/12/2022    WISDOM TOOTH EXTRACTION         Family History   Problem Relation Age of Onset    Arthritis Biological Mother     Heart disease Biological Father         CAD    Diabetes Biological Father     Hyperlipidemia Biological Father     Schizophrenia Biological Sister     No Known Problems Biological Son     No Known Problems Biological Daughter        Social History     Tobacco Use    Smoking status: Never    Smokeless tobacco: Never   Vaping Use    Vaping Use: Never used   Substance Use Topics    Alcohol use: Yes     Alcohol/week: 1.0 standard drink of alcohol     Types: 1 Standard drinks or equivalent per week    Drug use: Not Currently       Patient Care Team:  Rodger Cuba MD as PCP - General (Family Medicine)  Randall Noble MD as Consulting Physician (Gastroenterology)   Allergies and Medications     Allergies   Allergen Reactions    Sulfa (Sulfonamide  "Antibiotics) Hives       Current Outpatient Medications   Medication Sig Dispense Refill    ketoconazole (NIZORAL) 2 % cream Apply topically 2 (two) times a day. 30 g 3     No current facility-administered medications for this visit.      Review of Systems           Review of Systems   Constitutional: Negative for chills, diaphoresis, fatigue and fever.   HENT: Negative for congestion, ear pain, mouth sores, nosebleeds, trouble swallowing and voice change.    Eyes: Negative for photophobia, pain, discharge, redness and itching.   Respiratory: Negative for cough, choking, shortness of breath and stridor.    Cardiovascular: Negative for chest pain, palpitations and leg swelling.   Gastrointestinal: Negative for abdominal distention, abdominal pain, blood in stool and rectal pain.   Endocrine: Negative for cold intolerance, heat intolerance, polydipsia and polyphagia.   Genitourinary: Negative for flank pain, frequency, hematuria, penile discharge and urgency.   Musculoskeletal: Negative for arthralgias, gait problem, joint swelling and myalgias.   Skin: Negative for color change, pallor and rash.   Neurological: Negative for facial asymmetry, speech difficulty, weakness and numbness.      Physical Examination       Objective     Vitals:    10/11/23 1524   BP: 114/70   Pulse: 69   SpO2: 98%       Vitals:    10/11/23 1524   BP: 114/70   BP Location: Left upper arm   Patient Position: Sitting   Pulse: 69   SpO2: 98%       Wt Readings from Last 5 Encounters:   01/28/22 81 kg (178 lb 9.6 oz)   01/06/22 81.6 kg (180 lb)   01/11/21 77.2 kg (170 lb 3.2 oz)       Ht Readings from Last 5 Encounters:   01/28/22 1.791 m (5' 10.5\")   01/06/22 1.791 m (5' 10.5\")   01/11/21 1.778 m (5' 10\")       BP Readings from Last 5 Encounters:   10/11/23 114/70   01/28/22 118/78   01/06/22 112/78   01/11/21 112/78         Physical Exam  Vitals and nursing note reviewed.   Constitutional:       General: He is not in acute distress.     " Appearance: He is well-developed. He is not toxic-appearing.   HENT:      Head: Normocephalic and atraumatic.      Right Ear: External ear normal.      Left Ear: External ear normal.      Nose: Nose normal.   Eyes:      General: No scleral icterus.        Right eye: No discharge.         Left eye: No discharge.      Conjunctiva/sclera: Conjunctivae normal.      Pupils: Pupils are equal, round, and reactive to light.   Neck:      Vascular: No JVD.   Musculoskeletal:         General: Normal range of motion.      Cervical back: No muscular tenderness.   Skin:     General: Skin is warm and dry.      Comments: Noted slight redness and whitish exudate on the foreskin and glans penis.  No open sores or blistering.    Neurological:      Mental Status: He is alert and oriented to person, place, and time.      Motor: No weakness.      Coordination: Coordination normal.      Gait: Gait normal.          Laboratory Results       Lab Results   Component Value Date    WBC 5.5 01/18/2023    HGB 14.6 01/18/2023    HCT 43.2 01/18/2023    MCV 84 01/18/2023    PLT CANCELED 01/18/2023          Chemistry        Component Value Date/Time     01/18/2023 0740    K 4.2 01/18/2023 0740     01/18/2023 0740    CO2 23 01/18/2023 0740    BUN 14 01/18/2023 0740    CREATININE 1.12 01/18/2023 0740        Component Value Date/Time    ALKPHOS 73 01/18/2023 0740    AST 40 01/18/2023 0740    ALT 27 01/18/2023 0740    BILITOT 0.7 01/18/2023 0740            Lab Results   Component Value Date    GLUCOSE 91 01/18/2023    GLUCOSE 95 03/10/2022    GLUCOSE 124 (H) 01/07/2022     01/18/2023    K 4.2 01/18/2023    CO2 23 01/18/2023     01/18/2023    BUN 14 01/18/2023    CREATININE 1.12 01/18/2023       Lab Results   Component Value Date    CHOL 184 01/18/2023    CHOL 195 03/10/2022    CHOL 207 (H) 01/07/2022     Lab Results   Component Value Date    HDL 51 01/18/2023    HDL 52 03/10/2022    HDL 49 01/07/2022     Lab Results   Component  "Value Date    LDLCALC 114 (H) 01/18/2023    LDLCALC 127 (H) 03/10/2022    LDLCALC 125 (H) 01/07/2022     Lab Results   Component Value Date    TRIG 107 01/18/2023    TRIG 87 03/10/2022    TRIG 188 (H) 01/07/2022     No results found for: \"CHOLHDL\"    Lab Results   Component Value Date    TSH 0.832 01/18/2023       Lab Results   Component Value Date    HGBA1C 5.5 03/10/2022       Lab Results   Component Value Date    PSA 0.9 01/18/2023    PSA 1.1 01/07/2022    PSA 1.3 01/11/2021       Lab Results   Component Value Date    HEPCAB <0.1 01/19/2022         Immunization History   Administered Date(s) Administered    Influenza Vaccine Quadrivalent 3 Yr And Older 11/02/2021    Influenza Vaccine Quadrivalent Preservative Free 6 Mons and Up 08/28/2019, 10/11/2023    Influenza Vaccine Quadrivalent Preservative Free 6-35 Months 02/19/2018    Influenza, Live, Intranasal, Quadrivalent 09/19/2020    SARS-COV-2 (COVID-19) VACCINE, Pfizer / Mineralist Comirnaty, Fall 2023 Vaccine 09/27/2023    SARS-COV-2 (COVID19) VACCINE, PFIZER MONOVALENT 03/14/2021, 04/01/2021, 11/02/2021    Tdap 01/11/2021    Zoster Vaccine Recombinant Adjuvanted (Shingrix) 01/11/2021       Health Maintenance Topics with due status: Overdue       Topic Date Due    Hepatitis B Vaccines Never done    Depression Screening Never done    Zoster Vaccine 03/08/2021    Colorectal Cancer Screening 12/25/2021     Health Maintenance Topics with due status: Not Due       Topic Last Completion Date    DTaP, Tdap, and Td Vaccines 01/11/2021     Health Maintenance Topics with due status: Completed       Topic Last Completion Date    HIV Screening 01/19/2022    Hepatitis C Screening 01/19/2022    COVID-19 Vaccine 09/27/2023    Influenza Vaccine 10/11/2023     Health Maintenance Topics with due status: Aged Out       Topic Date Due    Meningococcal ACWY Aged Out    HIB Vaccines Aged Out    IPV Vaccines Aged Out    HPV Vaccines Aged Out    Pneumococcal Aged Out        "   Assessment and Plan      Diagnosis Plan   1. Skin yeast infection            Assessment/Plan     Problem List Items Addressed This Visit        Unprioritized    Skin yeast infection - Primary    Current Assessment & Plan     Rec Nizoral crm BID  Discussed keeping the area clean and dry.  Follow up PRN         Relevant Medications    ketoconazole (NIZORAL) 2 % cream       No follow-ups on file.    Orders Placed This Encounter   Procedures    Influenza vaccine quadrivalent preservative free 6 mon and older IM        I spent 22  minutes on this date of service performing the following activities: Obtaining history, performing examination, entering orders, documenting, preparing for visit, obtaining / reviewing records, providing counseling and education, independently reviewing study/studies, communicating results and coordinating care.  Comprehensive prescription drug management completed during the visit.     NOTE: Some or all of the note above was created with the use of dictation software, please note this dictation software can have anomalies in its ability to transcribe. Please contact me directly for anything that seems abnormal for clarification.       Indira Matthew, DO  10/11/2023

## 2023-10-11 ENCOUNTER — OFFICE VISIT (OUTPATIENT)
Dept: PRIMARY CARE | Facility: CLINIC | Age: 58
End: 2023-10-11
Payer: COMMERCIAL

## 2023-10-11 VITALS — OXYGEN SATURATION: 98 % | DIASTOLIC BLOOD PRESSURE: 70 MMHG | HEART RATE: 69 BPM | SYSTOLIC BLOOD PRESSURE: 114 MMHG

## 2023-10-11 DIAGNOSIS — B37.2 SKIN YEAST INFECTION: Primary | ICD-10-CM

## 2023-10-11 PROCEDURE — 90686 IIV4 VACC NO PRSV 0.5 ML IM: CPT | Performed by: FAMILY MEDICINE

## 2023-10-11 PROCEDURE — 99213 OFFICE O/P EST LOW 20 MIN: CPT | Mod: 25 | Performed by: FAMILY MEDICINE

## 2023-10-11 PROCEDURE — 90471 IMMUNIZATION ADMIN: CPT | Performed by: FAMILY MEDICINE

## 2023-10-11 RX ORDER — KETOCONAZOLE 20 MG/G
CREAM TOPICAL 2 TIMES DAILY
Qty: 30 G | Refills: 3 | Status: SHIPPED | OUTPATIENT
Start: 2023-10-11 | End: 2023-11-10

## 2023-10-11 ASSESSMENT — ENCOUNTER SYMPTOMS
SPEECH DIFFICULTY: 0
ABDOMINAL DISTENTION: 0
FEVER: 0
EYE DISCHARGE: 0
FATIGUE: 0
FACIAL ASYMMETRY: 0
EYE REDNESS: 0
POLYDIPSIA: 0
POLYPHAGIA: 0
CHOKING: 0
STRIDOR: 0
SHORTNESS OF BREATH: 0
FLANK PAIN: 0
PALPITATIONS: 0
EYE PAIN: 0
COLOR CHANGE: 0
MYALGIAS: 0
CHILLS: 0
FREQUENCY: 0
HEMATURIA: 0
PHOTOPHOBIA: 0
RECTAL PAIN: 0
EYE ITCHING: 0
NUMBNESS: 0
COUGH: 0
TROUBLE SWALLOWING: 0
ABDOMINAL PAIN: 0
WEAKNESS: 0
VOICE CHANGE: 0
BLOOD IN STOOL: 0
DIAPHORESIS: 0
ARTHRALGIAS: 0
JOINT SWELLING: 0

## 2023-10-12 ENCOUNTER — TELEPHONE (OUTPATIENT)
Dept: PRIMARY CARE | Facility: CLINIC | Age: 58
End: 2023-10-12
Payer: COMMERCIAL

## 2023-10-12 DIAGNOSIS — Z11.3 SCREENING FOR STD (SEXUALLY TRANSMITTED DISEASE): Primary | ICD-10-CM

## 2023-10-12 NOTE — TELEPHONE ENCOUNTER
Pt call back asking for acute hepatitis panel and a full complete bloods work panel to have done also

## 2023-10-12 NOTE — TELEPHONE ENCOUNTER
He wanted to mention at his visit yesterday, but forgot.  He would like to get some blood tests done to check for any type of sexually transmitted diseases.  Asking if that can be done.

## 2023-10-15 NOTE — TELEPHONE ENCOUNTER
Please let him know I recommend he follow-up with Dr. Cuba for a yearly lab panel as it appears he is not due for this until January 2024.

## 2023-10-31 RX ORDER — KETOCONAZOLE 20 MG/G
CREAM TOPICAL 2 TIMES DAILY
Qty: 30 G | Refills: 3 | Status: CANCELLED | OUTPATIENT
Start: 2023-10-31 | End: 2023-11-30

## 2023-11-02 NOTE — TELEPHONE ENCOUNTER
Appt made for feb 14th pt stated that he would like to cancel refill until further notice because he is out of town in [place] right now and will call for refill when he return

## 2023-11-11 LAB
HIV 1+2 AB+HIV1 P24 AG SERPL QL IA: NON REACTIVE
HSV1 IGG SER IA-ACNC: <0.91 INDEX (ref 0–0.9)
HSV2 IGG SER IA-ACNC: <0.91 INDEX (ref 0–0.9)
RPR SER QL: NON REACTIVE

## 2023-11-12 LAB
C TRACH RRNA SPEC QL NAA+PROBE: NEGATIVE
N GONORRHOEA RRNA SPEC QL NAA+PROBE: NEGATIVE

## 2024-01-04 ENCOUNTER — OFFICE VISIT (OUTPATIENT)
Dept: PRIMARY CARE | Facility: CLINIC | Age: 59
End: 2024-01-04
Payer: COMMERCIAL

## 2024-01-04 VITALS
RESPIRATION RATE: 16 BRPM | SYSTOLIC BLOOD PRESSURE: 138 MMHG | OXYGEN SATURATION: 98 % | HEART RATE: 95 BPM | DIASTOLIC BLOOD PRESSURE: 72 MMHG | TEMPERATURE: 99.5 F

## 2024-01-04 DIAGNOSIS — J06.9 ACUTE URI: Primary | ICD-10-CM

## 2024-01-04 PROBLEM — B37.2 SKIN YEAST INFECTION: Status: RESOLVED | Noted: 2023-10-11 | Resolved: 2024-01-04

## 2024-01-04 PROCEDURE — 87637 SARSCOV2&INF A&B&RSV AMP PRB: CPT | Performed by: FAMILY MEDICINE

## 2024-01-04 PROCEDURE — 99213 OFFICE O/P EST LOW 20 MIN: CPT | Performed by: FAMILY MEDICINE

## 2024-01-04 RX ORDER — METHYLPREDNISOLONE 4 MG/1
TABLET ORAL
Qty: 21 TABLET | Refills: 0 | Status: SHIPPED | OUTPATIENT
Start: 2024-01-04 | End: 2024-02-14 | Stop reason: ALTCHOICE

## 2024-01-04 RX ORDER — BROMPHENIRAMINE MALEATE, PSEUDOEPHEDRINE HYDROCHLORIDE, AND DEXTROMETHORPHAN HYDROBROMIDE 2; 30; 10 MG/5ML; MG/5ML; MG/5ML
5 SYRUP ORAL 4 TIMES DAILY PRN
Qty: 118 ML | Refills: 1 | Status: SHIPPED | OUTPATIENT
Start: 2024-01-04 | End: 2024-02-14 | Stop reason: ALTCHOICE

## 2024-01-04 ASSESSMENT — ENCOUNTER SYMPTOMS
PALPITATIONS: 0
DIAPHORESIS: 0
FATIGUE: 0
SORE THROAT: 0
SPEECH DIFFICULTY: 0
WHEEZING: 0
WEAKNESS: 0
CHILLS: 0
TROUBLE SWALLOWING: 0
COUGH: 1
MYALGIAS: 0
EYE PAIN: 0
NUMBNESS: 0
ABDOMINAL DISTENTION: 0
SINUS PRESSURE: 1
POLYPHAGIA: 0
STRIDOR: 0
POLYDIPSIA: 0
ABDOMINAL PAIN: 0
EYE ITCHING: 0
SHORTNESS OF BREATH: 0
FREQUENCY: 0
FACIAL ASYMMETRY: 0
FEVER: 0
CHOKING: 0
EYE DISCHARGE: 0
COLOR CHANGE: 0
EYE REDNESS: 0
RECTAL PAIN: 0
PHOTOPHOBIA: 0
VOICE CHANGE: 0
FLANK PAIN: 0
ARTHRALGIAS: 0
HEMATURIA: 0
JOINT SWELLING: 0
BLOOD IN STOOL: 0

## 2024-01-04 NOTE — PROGRESS NOTES
Indira Matthew D.O.  [facility]  [address]  [place] [address]  Phone: [phone]  Fax: [phone]     History of Present Illness       Patient ID: Dru Salazar is a 58 y.o. male.    Subjective     HPI  58 y.o.male  presents today with the following complaints/concerns:    Patient presents today with complaints of cough along with sinus pressure, congestion and headache for the past 4 days.  He feels slightly feverish the first day and has been taking Tylenol as needed.  Cough somewhat productive with yellowish mucus at times.  Denies body aches, sore throat or nausea.  He did a home COVID test that was negative.  He is taking Mucinex DM as needed.             Past Medical/Surgical/Family/Social History     The following have been reviewed and updated as appropriate in this visit:          Past Medical History:   Diagnosis Date   • Cervical spondylosis 5/11/2020    MRI in 03/2018 with Mild multilevel degenerative disc, uncovertebral and facet joint disease with mild central canal and foraminal stenosis from C4-5 to C6-7        Past Surgical History:   Procedure Laterality Date   • ROTATOR CUFF REPAIR Left 01/12/2022   • WISDOM TOOTH EXTRACTION         Family History   Problem Relation Age of Onset   • Arthritis Biological Mother    • Heart disease Biological Father         CAD   • Diabetes Biological Father    • Hyperlipidemia Biological Father    • Schizophrenia Biological Sister    • No Known Problems Biological Son    • No Known Problems Biological Daughter        Social History     Tobacco Use   • Smoking status: Never   • Smokeless tobacco: Never   Vaping Use   • Vaping Use: Never used   Substance Use Topics   • Alcohol use: Yes     Alcohol/week: 1.0 standard drink of alcohol     Types: 1 Standard drinks or equivalent per week   • Drug use: Not Currently       Patient Care Team:  Rodger Cuba MD as PCP - General (Family Medicine)  Randall Noble,  MD as Consulting Physician (Gastroenterology)   Allergies and Medications     Allergies   Allergen Reactions   • Sulfa (Sulfonamide Antibiotics) Hives       Current Outpatient Medications   Medication Sig Dispense Refill   • brompheniramine-pseudoeph-DM 2-30-10 mg/5 mL syrup Take 5 mL by mouth 4 (four) times a day as needed for cough or congestion. 118 mL 1   • methylPREDNISolone (MEDROL DOSEPACK) 4 mg tablet Follow package directions. Take until finished. 21 tablet 0     No current facility-administered medications for this visit.      Review of Systems           Review of Systems   Constitutional: Negative for chills, diaphoresis, fatigue and fever.   HENT: Positive for congestion, postnasal drip and sinus pressure. Negative for ear pain, mouth sores, nosebleeds, sore throat, trouble swallowing and voice change.    Eyes: Negative for photophobia, pain, discharge, redness and itching.   Respiratory: Positive for cough. Negative for choking, shortness of breath, wheezing and stridor.    Cardiovascular: Negative for chest pain, palpitations and leg swelling.   Gastrointestinal: Negative for abdominal distention, abdominal pain, blood in stool and rectal pain.   Endocrine: Negative for cold intolerance, heat intolerance, polydipsia and polyphagia.   Genitourinary: Negative for flank pain, frequency, hematuria, penile discharge and urgency.   Musculoskeletal: Negative for arthralgias, gait problem, joint swelling and myalgias.   Skin: Negative for color change, pallor and rash.   Neurological: Negative for facial asymmetry, speech difficulty, weakness and numbness.      Physical Examination       Objective     Vitals:    01/04/24 0849   BP: 138/72   Pulse: 95   Resp: 16   Temp: 37.5 °C (99.5 °F)   SpO2: 98%       Vitals:    01/04/24 0849   BP: 138/72   BP Location: Left upper arm   Patient Position: Sitting   Pulse: 95   Resp: 16   Temp: 37.5 °C (99.5 °F)   TempSrc: Oral   SpO2: 98%       Wt Readings from Last 5  "Encounters:   01/28/22 81 kg (178 lb 9.6 oz)   01/06/22 81.6 kg (180 lb)   01/11/21 77.2 kg (170 lb 3.2 oz)       Ht Readings from Last 5 Encounters:   01/28/22 1.791 m (5' 10.5\")   01/06/22 1.791 m (5' 10.5\")   01/11/21 1.778 m (5' 10\")       BP Readings from Last 5 Encounters:   01/04/24 138/72   10/11/23 114/70   01/28/22 118/78   01/06/22 112/78   01/11/21 112/78         Physical Exam  Vitals and nursing note reviewed.   Constitutional:       General: He is not in acute distress.     Appearance: He is well-developed. He is not toxic-appearing.   HENT:      Head: Normocephalic and atraumatic.      Right Ear: Tympanic membrane, ear canal and external ear normal.      Left Ear: Tympanic membrane, ear canal and external ear normal.      Nose: Nose normal.   Eyes:      General: No scleral icterus.        Right eye: No discharge.         Left eye: No discharge.      Conjunctiva/sclera: Conjunctivae normal.      Pupils: Pupils are equal, round, and reactive to light.   Neck:      Vascular: No JVD.   Cardiovascular:      Rate and Rhythm: Normal rate and regular rhythm.      Heart sounds: Normal heart sounds. No murmur heard.     No gallop.   Pulmonary:      Effort: Pulmonary effort is normal. No respiratory distress.      Breath sounds: Normal breath sounds. No stridor. No wheezing or rhonchi.   Musculoskeletal:         General: No swelling or deformity. Normal range of motion.      Cervical back: Normal range of motion and neck supple. No rigidity. No muscular tenderness.      Right lower leg: No edema.      Left lower leg: No edema.   Lymphadenopathy:      Cervical: No cervical adenopathy.   Skin:     General: Skin is warm and dry.      Coloration: Skin is not pale.      Findings: No erythema or rash.   Neurological:      Mental Status: He is alert and oriented to person, place, and time.      Cranial Nerves: No cranial nerve deficit.      Motor: No weakness.      Coordination: Coordination normal.      Gait: Gait " "normal.          Laboratory Results       Lab Results   Component Value Date    WBC 5.5 01/18/2023    HGB 14.6 01/18/2023    HCT 43.2 01/18/2023    MCV 84 01/18/2023    PLT CANCELED 01/18/2023          Chemistry        Component Value Date/Time     01/18/2023 0740    K 4.2 01/18/2023 0740     01/18/2023 0740    CO2 23 01/18/2023 0740    BUN 14 01/18/2023 0740    CREATININE 1.12 01/18/2023 0740        Component Value Date/Time    ALKPHOS 73 01/18/2023 0740    AST 40 01/18/2023 0740    ALT 27 01/18/2023 0740    BILITOT 0.7 01/18/2023 0740            Lab Results   Component Value Date    GLUCOSE 91 01/18/2023    GLUCOSE 95 03/10/2022    GLUCOSE 124 (H) 01/07/2022     01/18/2023    K 4.2 01/18/2023    CO2 23 01/18/2023     01/18/2023    BUN 14 01/18/2023    CREATININE 1.12 01/18/2023       Lab Results   Component Value Date    CHOL 184 01/18/2023    CHOL 195 03/10/2022    CHOL 207 (H) 01/07/2022     Lab Results   Component Value Date    HDL 51 01/18/2023    HDL 52 03/10/2022    HDL 49 01/07/2022     Lab Results   Component Value Date    LDLCALC 114 (H) 01/18/2023    LDLCALC 127 (H) 03/10/2022    LDLCALC 125 (H) 01/07/2022     Lab Results   Component Value Date    TRIG 107 01/18/2023    TRIG 87 03/10/2022    TRIG 188 (H) 01/07/2022     No results found for: \"CHOLHDL\"    Lab Results   Component Value Date    TSH 0.832 01/18/2023       Lab Results   Component Value Date    HGBA1C 5.5 03/10/2022       Lab Results   Component Value Date    PSA 0.9 01/18/2023    PSA 1.1 01/07/2022    PSA 1.3 01/11/2021       Lab Results   Component Value Date    HEPCAB <0.1 01/19/2022         Immunization History   Administered Date(s) Administered   • Influenza Vaccine Quadrivalent 3 Yr And Older 11/02/2021   • Influenza Vaccine Quadrivalent Preservative Free 6 Mons and Up 08/28/2019, 10/11/2023   • Influenza Vaccine Quadrivalent Preservative Free 6-35 Months 02/19/2018   • Influenza, Live, Intranasal, Quadrivalent " 09/19/2020   • SARS-COV-2 (COVID-19) VACCINE, Pfizer / [employer] Comirnaty, Fall 2023 Vaccine 09/27/2023   • SARS-COV-2 (COVID19) VACCINE, PFIZER MONOVALENT 03/14/2021, 04/01/2021, 11/02/2021   • Tdap 01/11/2021   • Zoster Vaccine Recombinant Adjuvanted (Shingrix) 01/11/2021       Health Maintenance Topics with due status: Overdue       Topic Date Due    Hepatitis B Vaccines Never done    Depression Screening Never done    Zoster Vaccine 03/08/2021    Colorectal Cancer Screening 12/25/2021     Health Maintenance Topics with due status: Not Due       Topic Last Completion Date    DTaP, Tdap, and Td Vaccines 01/11/2021     Health Maintenance Topics with due status: Completed       Topic Last Completion Date    Hepatitis C Screening 01/19/2022    COVID-19 Vaccine 09/27/2023    Influenza Vaccine 10/11/2023    HIV Screening 11/10/2023     Health Maintenance Topics with due status: Aged Out       Topic Date Due    Meningococcal ACWY Aged Out    HIB Vaccines Aged Out    IPV Vaccines Aged Out    HPV Vaccines Aged Out    Pneumococcal Aged Out          Assessment and Plan      Diagnosis Plan   1. Acute URI  SARS-COV-2 (COVID-19)/ FLU A/B, AND RSV, PCR          Assessment/Plan     Problem List Items Addressed This Visit        Unprioritized    Acute URI - Primary    Current Assessment & Plan     Viral URI.  Viral swab obtained during today's visit.  Results are pending.  At this time prescribed Medrol Dosepak take as directed until finished.  Bromfed-DM as needed for cough and congestion.  Continue with Tylenol as needed.  Plenty of fluids and rest.  Further recommendations pending results of the viral culture.         Relevant Orders    SARS-COV-2 (COVID-19)/ FLU A/B, AND RSV, PCR       No follow-ups on file.    Orders Placed This Encounter   Procedures   • SARS-COV-2 (COVID-19)/ FLU A/B, AND RSV, PCR     Order Specific Question:   Reason for testing:     Answer:   Symptomatic     Order Specific Question:   COVID-19 PUI?  "(Always anny \"yes\" regardless of resulting lab)     Answer:   Yes     Order Specific Question:   Symptomatic for COVID-19 as defined by CDC?     Answer:   Yes     Order Specific Question:   Date of Symptom Onset     Answer:   1/1/2024     Order Specific Question:   Hospitalized for COVID-19?     Answer:   No     Order Specific Question:   Admitted to ICU for COVID-19?     Answer:   No     Order Specific Question:   Employed in healthcare setting?     Answer:   No     Order Specific Question:   Resident in a congregate (group) care setting?     Answer:   No     Order Specific Question:   Release to patient     Answer:   Immediate     Order Specific Question:   Release to patient     Answer:   Immediate [1]        I spent 26  minutes on this date of service performing the following activities: Obtaining history, performing examination, entering orders, documenting, preparing for visit, obtaining / reviewing records, providing counseling and education, independently reviewing study/studies, communicating results and coordinating care.  Comprehensive prescription drug management completed during the visit.     NOTE: Some or all of the note above was created with the use of dictation software, please note this dictation software can have anomalies in its ability to transcribe. Please contact me directly for anything that seems abnormal for clarification.       Indira Matthew, DO  1/4/2024     "

## 2024-01-04 NOTE — ASSESSMENT & PLAN NOTE
Viral URI.  Viral swab obtained during today's visit.  Results are pending.  At this time prescribed Medrol Dosepak take as directed until finished.  Bromfed-DM as needed for cough and congestion.  Continue with Tylenol as needed.  Plenty of fluids and rest.  Further recommendations pending results of the viral culture.

## 2024-01-05 LAB
FLUAV RNA SPEC QL NAA+PROBE: NEGATIVE
FLUBV RNA SPEC QL NAA+PROBE: NEGATIVE
RSV RNA SPEC QL NAA+PROBE: NEGATIVE
SARS-COV-2 RNA RESP QL NAA+PROBE: NEGATIVE

## 2024-01-11 ENCOUNTER — TELEPHONE (OUTPATIENT)
Dept: PRIMARY CARE | Facility: CLINIC | Age: 59
End: 2024-01-11
Payer: COMMERCIAL

## 2024-01-11 ENCOUNTER — TELEPHONE (OUTPATIENT)
Dept: FAMILY MEDICINE | Facility: CLINIC | Age: 59
End: 2024-01-11
Payer: COMMERCIAL

## 2024-01-12 NOTE — TELEPHONE ENCOUNTER
Paged by answering service by patient.  States he called and message office today about medication but was not contacted back.  He cannot get the liquid cough medication that was prescribed to him last week for his cough.  I sent a different PRN cough medicine into his [facility] pharmacy.

## 2024-01-29 ENCOUNTER — TELEPHONE (OUTPATIENT)
Dept: PRIMARY CARE | Facility: CLINIC | Age: 59
End: 2024-01-29
Payer: COMMERCIAL

## 2024-01-29 DIAGNOSIS — Z00.00 ANNUAL PHYSICAL EXAM: Primary | ICD-10-CM

## 2024-01-29 NOTE — TELEPHONE ENCOUNTER
Test Order Request   Patient PCP: Rodger Cuba MD  Next Office Visit: 2/14/2024      Pt requesting fasting lab orders for his upcoming physical with Dr Cuba. Pt has appt on 2/14 and asking to have blood work done prior to visit      , MyChart, or US Mail?: Mychart    The practice will reach out to discuss your request within 2 business days.

## 2024-02-14 ENCOUNTER — TELEPHONE (OUTPATIENT)
Dept: PRIMARY CARE | Facility: CLINIC | Age: 59
End: 2024-02-14

## 2024-02-14 ENCOUNTER — OFFICE VISIT (OUTPATIENT)
Dept: PRIMARY CARE | Facility: CLINIC | Age: 59
End: 2024-02-14
Payer: COMMERCIAL

## 2024-02-14 VITALS
TEMPERATURE: 97.5 F | OXYGEN SATURATION: 98 % | HEIGHT: 71 IN | BODY MASS INDEX: 25.03 KG/M2 | HEART RATE: 72 BPM | DIASTOLIC BLOOD PRESSURE: 76 MMHG | SYSTOLIC BLOOD PRESSURE: 118 MMHG | WEIGHT: 178.8 LBS | RESPIRATION RATE: 16 BRPM

## 2024-02-14 DIAGNOSIS — M25.561 CHRONIC PAIN OF BOTH KNEES: ICD-10-CM

## 2024-02-14 DIAGNOSIS — M25.562 CHRONIC PAIN OF BOTH KNEES: ICD-10-CM

## 2024-02-14 DIAGNOSIS — G89.29 CHRONIC PAIN OF BOTH KNEES: ICD-10-CM

## 2024-02-14 DIAGNOSIS — Z00.00 ENCOUNTER FOR GENERAL ADULT MEDICAL EXAMINATION WITHOUT ABNORMAL FINDINGS: Primary | ICD-10-CM

## 2024-02-14 PROBLEM — Z01.818 PREOP EXAMINATION: Status: RESOLVED | Noted: 2022-01-06 | Resolved: 2024-02-14

## 2024-02-14 PROBLEM — J06.9 ACUTE URI: Status: RESOLVED | Noted: 2024-01-04 | Resolved: 2024-02-14

## 2024-02-14 PROCEDURE — 99396 PREV VISIT EST AGE 40-64: CPT | Performed by: FAMILY MEDICINE

## 2024-02-14 PROCEDURE — 3008F BODY MASS INDEX DOCD: CPT | Performed by: FAMILY MEDICINE

## 2024-02-14 RX ORDER — MONTELUKAST SODIUM 10 MG/1
10 TABLET ORAL DAILY
COMMUNITY
Start: 2024-01-03 | End: 2024-02-14 | Stop reason: ALTCHOICE

## 2024-02-14 SDOH — HEALTH STABILITY: PHYSICAL HEALTH: ON AVERAGE, HOW MANY MINUTES DO YOU ENGAGE IN EXERCISE AT THIS LEVEL?: 50 MIN

## 2024-02-14 SDOH — HEALTH STABILITY: PHYSICAL HEALTH: ON AVERAGE, HOW MANY DAYS PER WEEK DO YOU ENGAGE IN MODERATE TO STRENUOUS EXERCISE (LIKE A BRISK WALK)?: 3 DAYS

## 2024-02-14 SDOH — ECONOMIC STABILITY: FOOD INSECURITY: WITHIN THE PAST 12 MONTHS, YOU WORRIED THAT YOUR FOOD WOULD RUN OUT BEFORE YOU GOT MONEY TO BUY MORE.: NEVER TRUE

## 2024-02-14 SDOH — ECONOMIC STABILITY: FOOD INSECURITY: WITHIN THE PAST 12 MONTHS, THE FOOD YOU BOUGHT JUST DIDN'T LAST AND YOU DIDN'T HAVE MONEY TO GET MORE.: NEVER TRUE

## 2024-02-14 ASSESSMENT — ENCOUNTER SYMPTOMS
CONFUSION: 0
CHILLS: 0
NUMBNESS: 0
DIFFICULTY URINATING: 0
DYSPHORIC MOOD: 0
WEAKNESS: 0
VOMITING: 0
HEADACHES: 0
NECK PAIN: 0
EYE PAIN: 0
ARTHRALGIAS: 0
UNEXPECTED WEIGHT CHANGE: 0
WHEEZING: 0
ROS GI COMMENTS: OCCASIONAL HEARTBURN.
SPEECH DIFFICULTY: 0
TROUBLE SWALLOWING: 0
EYE DISCHARGE: 0
DYSURIA: 0
FREQUENCY: 0
NERVOUS/ANXIOUS: 0
CHEST TIGHTNESS: 0
SHORTNESS OF BREATH: 0
FATIGUE: 0
NECK STIFFNESS: 0
NAUSEA: 0
FEVER: 0
ABDOMINAL PAIN: 0
DIARRHEA: 0
FLANK PAIN: 0
BACK PAIN: 0
SINUS PAIN: 0
CONSTIPATION: 0
DIZZINESS: 0
EYE REDNESS: 0
SINUS PRESSURE: 0
SORE THROAT: 0
COUGH: 0
RHINORRHEA: 0
SLEEP DISTURBANCE: 0
LIGHT-HEADEDNESS: 0
MYALGIAS: 0
HEMATURIA: 0
APPETITE CHANGE: 0
BLOOD IN STOOL: 0
BRUISES/BLEEDS EASILY: 0

## 2024-02-14 ASSESSMENT — PATIENT HEALTH QUESTIONNAIRE - PHQ9: SUM OF ALL RESPONSES TO PHQ9 QUESTIONS 1 & 2: 0

## 2024-02-14 ASSESSMENT — SOCIAL DETERMINANTS OF HEALTH (SDOH): HOW HARD IS IT FOR YOU TO PAY FOR THE VERY BASICS LIKE FOOD, HOUSING, MEDICAL CARE, AND HEATING?: NOT HARD AT ALL

## 2024-02-14 ASSESSMENT — LIFESTYLE VARIABLES
HOW OFTEN DO YOU HAVE SIX OR MORE DRINKS ON ONE OCCASION: NEVER
HOW OFTEN DO YOU HAVE A DRINK CONTAINING ALCOHOL: 2-4 TIMES A MONTH
HOW MANY STANDARD DRINKS CONTAINING ALCOHOL DO YOU HAVE ON A TYPICAL DAY: 1 OR 2

## 2024-02-14 NOTE — TELEPHONE ENCOUNTER
· Spoke with avelino at [facility] to send over colonoscopy done last year ( ext [phone] )

## 2024-02-14 NOTE — PROGRESS NOTES
Rodger Cuba MD    [facility]  [address], [address]  [place] [address]  Phone: [phone]  Fax: [phone]     History of Present Illness     Subjective     Patient ID: Dru Salazar is a 58 y.o. male.    The patient present for an annual preventative visit.         Past Medical/Surgical/Family/Social History       The following have been reviewed and updated as appropriate in this visit:   Allergies  Meds  Problems         Past Medical History:   Diagnosis Date   • Cervical spondylosis 5/11/2020    MRI in 03/2018 with Mild multilevel degenerative disc, uncovertebral and facet joint disease with mild central canal and foraminal stenosis from C4-5 to C6-7        Past Surgical History:   Procedure Laterality Date   • KNEE ARTHROSCOPY W/ MENISCECTOMY Left 02/2022   • ROTATOR CUFF REPAIR Left 01/12/2022   • WISDOM TOOTH EXTRACTION         Family History   Problem Relation Age of Onset   • Arthritis Biological Mother    • Heart disease Biological Father         CAD   • Diabetes Biological Father    • Hyperlipidemia Biological Father    • Schizophrenia Biological Sister    • No Known Problems Biological Daughter    • Other Biological Son         BRCA gene carrier       Social History     Tobacco Use   • Smoking status: Never   • Smokeless tobacco: Never   Vaping Use   • Vaping Use: Never used   Substance Use Topics   • Alcohol use: Yes     Alcohol/week: 1.0 standard drink of alcohol     Types: 1 Standard drinks or equivalent per week   • Drug use: Not Currently       Patient Care Team:  Rodger Cuba MD as PCP - General (Family Medicine)  Randall Noble MD as Consulting Physician (Gastroenterology)       Allergies and Medications       Allergies   Allergen Reactions   • Sulfa (Sulfonamide Antibiotics) Hives     No current outpatient medications on file.     No current facility-administered medications for this visit.        Review of Systems       Review of Systems  "  Constitutional: Negative for appetite change, chills, fatigue, fever and unexpected weight change.   HENT: Negative for congestion, ear pain, hearing loss, nosebleeds, postnasal drip, rhinorrhea, sinus pressure, sinus pain, sneezing, sore throat, tinnitus and trouble swallowing.    Eyes: Negative for pain, discharge, redness and visual disturbance.   Respiratory: Negative for cough, chest tightness, shortness of breath and wheezing.    Cardiovascular: Negative for chest pain and leg swelling.   Gastrointestinal: Negative for abdominal pain, blood in stool, constipation, diarrhea, nausea and vomiting.        Occasional heartburn.    Endocrine: Negative for cold intolerance and heat intolerance.   Genitourinary: Negative for decreased urine volume, difficulty urinating, dysuria, flank pain, frequency, hematuria and urgency.   Musculoskeletal: Negative for arthralgias, back pain, gait problem, myalgias, neck pain and neck stiffness.        Knee pain bilateral, worse with sitting.     Skin: Negative for rash.   Allergic/Immunologic: Negative for environmental allergies.   Neurological: Negative for dizziness, syncope, speech difficulty, weakness, light-headedness, numbness and headaches.   Hematological: Does not bruise/bleed easily.   Psychiatric/Behavioral: Negative for behavioral problems, confusion, dysphoric mood and sleep disturbance. The patient is not nervous/anxious.         Physical Examination       Objective     Vitals:    02/14/24 0840   BP: 118/76   Pulse: 72   Resp: 16   Temp: 36.4 °C (97.5 °F)   SpO2: 98%       Pulse Readings from Last 5 Encounters:   02/14/24 72   01/04/24 95   10/11/23 69   01/28/22 76   01/06/22 84       Wt Readings from Last 5 Encounters:   02/14/24 81.1 kg (178 lb 12.8 oz)   01/28/22 81 kg (178 lb 9.6 oz)   01/06/22 81.6 kg (180 lb)   01/11/21 77.2 kg (170 lb 3.2 oz)       Ht Readings from Last 5 Encounters:   02/14/24 1.803 m (5' 11\")   01/28/22 1.791 m (5' 10.5\")   01/06/22 " "1.791 m (5' 10.5\")   01/11/21 1.778 m (5' 10\")       BP Readings from Last 5 Encounters:   02/14/24 118/76   01/04/24 138/72   10/11/23 114/70   01/28/22 118/78   01/06/22 112/78       BMI Readings from Last 4 Encounters:   02/14/24 24.94 kg/m²   01/28/22 25.26 kg/m²   01/06/22 25.46 kg/m²   01/11/21 24.42 kg/m²       Physical Exam  Constitutional:       General: He is not in acute distress.     Appearance: Normal appearance. He is well-developed. He is not ill-appearing, toxic-appearing or diaphoretic.   HENT:      Head: Normocephalic and atraumatic.      Right Ear: Tympanic membrane, ear canal and external ear normal.      Left Ear: Tympanic membrane, ear canal and external ear normal.      Nose: No congestion or rhinorrhea.      Mouth/Throat:      Pharynx: No oropharyngeal exudate or posterior oropharyngeal erythema.   Eyes:      General: Lids are normal.      Conjunctiva/sclera: Conjunctivae normal.      Pupils: Pupils are equal, round, and reactive to light.   Neck:      Thyroid: No thyromegaly.      Vascular: No carotid bruit.   Cardiovascular:      Rate and Rhythm: Normal rate and regular rhythm.      Pulses:           Popliteal pulses are 2+ on the right side and 2+ on the left side.        Dorsalis pedis pulses are 2+ on the right side and 2+ on the left side.      Heart sounds: Normal heart sounds. No murmur heard.     No gallop.   Pulmonary:      Effort: Pulmonary effort is normal. No tachypnea, accessory muscle usage or respiratory distress.      Breath sounds: Normal breath sounds. No decreased breath sounds, wheezing, rhonchi or rales.   Abdominal:      General: Bowel sounds are normal. There is no distension.      Palpations: Abdomen is soft.      Tenderness: There is no abdominal tenderness. There is no guarding or rebound.      Hernia: No hernia is present. There is no hernia in the left inguinal area or right inguinal area.   Genitourinary:     Prostate: Normal.   Musculoskeletal:      Cervical " back: Full passive range of motion without pain, normal range of motion and neck supple.      Right lower leg: No edema.      Left lower leg: No edema.   Lymphadenopathy:      Cervical: No cervical adenopathy.   Skin:     General: Skin is warm and dry.      Findings: No rash.   Neurological:      Mental Status: He is alert.   Psychiatric:         Mood and Affect: Mood normal. Mood is not anxious or depressed.         Speech: Speech normal.         Behavior: Behavior normal. Behavior is cooperative.         Thought Content: Thought content normal.         Judgment: Judgment normal.          Laboratory Results     Lab Results   Component Value Date    WBC 5.5 01/18/2023    WBC 6.1 01/07/2022    WBC 6.5 01/11/2021    HGB 14.6 01/18/2023    HGB 14.5 01/07/2022    HGB 14.4 01/11/2021    HCT 43.2 01/18/2023    HCT 42.9 01/07/2022    HCT 42.5 01/11/2021    MCV 84 01/18/2023    MCV 83 01/07/2022    MCV 84 01/11/2021    PLT CANCELED 01/18/2023    PLT CANCELED 01/07/2022    PLT CANCELED 01/11/2021        Lab Results   Component Value Date    GLUCOSE 91 01/18/2023    GLUCOSE 95 03/10/2022    GLUCOSE 124 (H) 01/07/2022     01/18/2023     03/10/2022     01/07/2022    K 4.2 01/18/2023    K 4.2 03/10/2022    K 4.2 01/07/2022    CO2 23 01/18/2023    CO2 24 03/10/2022    CO2 25 01/07/2022     01/18/2023     03/10/2022     01/07/2022    BUN 14 01/18/2023    BUN 20 03/10/2022    BUN 12 01/07/2022    CREATININE 1.12 01/18/2023    CREATININE 0.94 03/10/2022    CREATININE 1.01 01/07/2022    ALKPHOS 73 01/18/2023    ALKPHOS 66 01/07/2022    ALKPHOS 62 01/11/2021    AST 40 01/18/2023    AST 31 01/07/2022    AST 21 01/11/2021    ALT 27 01/18/2023    ALT 41 01/07/2022    ALT 16 01/11/2021    BILITOT 0.7 01/18/2023    BILITOT 0.4 01/07/2022    BILITOT 0.3 01/11/2021    ALBUMIN 4.5 01/18/2023    ALBUMIN 4.4 01/07/2022    ALBUMIN 4.4 01/11/2021       Lab Results   Component Value Date    CHOL 184 01/18/2023     CHOL 195 03/10/2022    CHOL 207 (H) 01/07/2022     Lab Results   Component Value Date    HDL 51 01/18/2023    HDL 52 03/10/2022    HDL 49 01/07/2022     Lab Results   Component Value Date    LDLCALC 114 (H) 01/18/2023    LDLCALC 127 (H) 03/10/2022    LDLCALC 125 (H) 01/07/2022     Lab Results   Component Value Date    TRIG 107 01/18/2023    TRIG 87 03/10/2022    TRIG 188 (H) 01/07/2022       The 10-year ASCVD risk score (Rosette KELLY, et al., 2019) is: 4.8%    Values used to calculate the score:      Age: 58 years      Sex: Male      Is Non- : No      Diabetic: No      Tobacco smoker: No      Systolic Blood Pressure: 118 mmHg      Is BP treated: No      HDL Cholesterol: 51 mg/dL      Total Cholesterol: 184 mg/dL    Lab Results   Component Value Date    TSH 0.832 01/18/2023    TSH 0.786 01/07/2022    TSH 0.707 01/11/2021     Lab Results   Component Value Date    HGBA1C 5.5 03/10/2022    HGBA1C 5.4 01/11/2021     Lab Results   Component Value Date    PSA 0.9 01/18/2023    PSA 1.1 01/07/2022    PSA 1.3 01/11/2021     Lab Results   Component Value Date    HEPCAB <0.1 01/19/2022     Immunization History   Administered Date(s) Administered   • Influenza Vaccine Quadrivalent 3 Yr And Older 11/02/2021   • Influenza Vaccine Quadrivalent Preservative Free 6 Mons and Up 08/28/2019, 10/11/2023   • Influenza Vaccine Quadrivalent Preservative Free 6-35 Months 02/19/2018   • Influenza, Live, Intranasal, Quadrivalent 09/19/2020   • SARS-COV-2 (COVID-19) VACCINE, Pfizer / Mobissimo Comirnaty, Fall 2023 Vaccine 09/27/2023   • SARS-COV-2 (COVID19) VACCINE, PFIZER MONOVALENT 03/14/2021, 04/01/2021, 11/02/2021   • Tdap 01/11/2021   • Donald-Sucrose (Covid-19) Arthur Cap, Pfizer Monovalent 05/07/2022   • Zoster Vaccine Recombinant Adjuvanted (Shingrix) 01/11/2021     Health Maintenance Topics with due status: Overdue       Topic Date Due    Zoster Vaccine 03/08/2021    Colorectal Cancer Screening 12/25/2021     Health  Maintenance Topics with due status: Not Due       Topic Last Completion Date    DTaP, Tdap, and Td Vaccines 01/11/2021    Depression Screening 02/14/2024     Health Maintenance Topics with due status: Completed       Topic Last Completion Date    Hepatitis C Screening 01/19/2022    COVID-19 Vaccine 09/27/2023    Influenza Vaccine 10/11/2023    HIV Screening 11/10/2023     Health Maintenance Topics with due status: Aged Out       Topic Date Due    Meningococcal ACWY Aged Out    RSV <20 months Aged Out    HIB Vaccines Aged Out    IPV Vaccines Aged Out    HPV Vaccines Aged Out    Pneumococcal Aged Out     Health Maintenance Topics with due status: Discontinued       Topic Date Due    Hepatitis B Vaccines Discontinued        Assessment and Plan       Assessment/Plan     Problem List Items Addressed This Visit     Chronic pain of both knees    Current Assessment & Plan     Check xray of knees         Relevant Orders    X-RAY KNEE LEFT 3 VIEWS    X-RAY KNEE RIGHT 3 VIEWS    Encounter for general adult medical examination without abnormal findings - Primary    Current Assessment & Plan     · The patient is currently stable.   · Bloodwork was ordered including a complete blood count, complete metabolic profile, thyroid stimulating hormone, PSA,and a lipid profile.   · Further recommendations will be provided to the patient based on the results of the tests.   · The patient should continue to exercise at 70 percent of his maximal heart rate for 2.5 hours per week.   · All the recommend vaccinations are up to date.   · The patient should be evaluated by the ophthalmologist every 1-2 years and dentist every 6 months.   · The patient was advised to protect the skin by applying suntan lotion containing an SPF > 30 every 2 hours while in sun or after swimming. Instructed to avoid prolonged direct sun exposure as much as possible.   · The patient's body mass index is normal.   · The patient's colonoscopy is up to date.   · Advised  to consume 1000 mg of calcium daily through the diet. If the patient is unable to consume 1000 mg through the diet, then taking calcium supplements is an acceptable alternative. The patient was informed not to consume more than 500 mg of a calcium supplement at a time.              Relevant Orders    Testosterone    Testosterone, free, total    FSH/LH       Return in about 1 year (around 2/14/2025) for Physical Exam.    Orders Placed This Encounter   Procedures   • X-RAY KNEE LEFT 3 VIEWS     Standing Status:   Future     Standing Expiration Date:   2/14/2025     Order Specific Question:   Release to patient     Answer:   Immediate [1]   • X-RAY KNEE RIGHT 3 VIEWS     Standing Status:   Future     Standing Expiration Date:   2/14/2025     Order Specific Question:   Release to patient     Answer:   Immediate [1]   • Testosterone     Standing Status:   Future     Number of Occurrences:   1     Standing Expiration Date:   2/14/2025     Order Specific Question:   Release to patient     Answer:   Immediate [1]   • Testosterone, free, total     Standing Status:   Future     Number of Occurrences:   1     Standing Expiration Date:   2/14/2025     Order Specific Question:   Release to patient     Answer:   Immediate [1]   • FSH/LH     Standing Status:   Future     Number of Occurrences:   1     Standing Expiration Date:   2/14/2025     Order Specific Question:   Release to patient     Answer:   Immediate [1]              Rodger Cuba MD    2/14/2024

## 2024-02-14 NOTE — ASSESSMENT & PLAN NOTE
· The patient is currently stable.   · Bloodwork was ordered including a complete blood count, complete metabolic profile, thyroid stimulating hormone, PSA,and a lipid profile.   · Further recommendations will be provided to the patient based on the results of the tests.   · The patient should continue to exercise at 70 percent of his maximal heart rate for 2.5 hours per week.   · All the recommend vaccinations are up to date.   · The patient should be evaluated by the ophthalmologist every 1-2 years and dentist every 6 months.   · The patient was advised to protect the skin by applying suntan lotion containing an SPF > 30 every 2 hours while in sun or after swimming. Instructed to avoid prolonged direct sun exposure as much as possible.   · The patient's body mass index is normal.   · The patient's colonoscopy is up to date.   · Advised to consume 1000 mg of calcium daily through the diet. If the patient is unable to consume 1000 mg through the diet, then taking calcium supplements is an acceptable alternative. The patient was informed not to consume more than 500 mg of a calcium supplement at a time.

## 2024-02-14 NOTE — PATIENT INSTRUCTIONS
Problem List Items Addressed This Visit       Chronic pain of both knees     Check xray of knees         Relevant Orders    X-RAY KNEE LEFT 3 VIEWS    X-RAY KNEE RIGHT 3 VIEWS    Encounter for general adult medical examination without abnormal findings - Primary     The patient is currently stable.   Bloodwork was ordered including a complete blood count, complete metabolic profile, thyroid stimulating hormone, PSA,and a lipid profile.   Further recommendations will be provided to the patient based on the results of the tests.   The patient should continue to exercise at 70 percent of his maximal heart rate for 2.5 hours per week.   All the recommend vaccinations are up to date.   The patient should be evaluated by the ophthalmologist every 1-2 years and dentist every 6 months.   The patient was advised to protect the skin by applying suntan lotion containing an SPF > 30 every 2 hours while in sun or after swimming. Instructed to avoid prolonged direct sun exposure as much as possible.   The patient's body mass index is normal.   The patient's colonoscopy is up to date.   Advised to consume 1000 mg of calcium daily through the diet. If the patient is unable to consume 1000 mg through the diet, then taking calcium supplements is an acceptable alternative. The patient was informed not to consume more than 500 mg of a calcium supplement at a time.              Relevant Orders    Testosterone    Testosterone, free, total    FSH/LH

## 2024-08-30 ENCOUNTER — DOCUMENTATION (OUTPATIENT)
Dept: PRIMARY CARE | Facility: CLINIC | Age: 59
End: 2024-08-30
Payer: COMMERCIAL

## 2024-08-30 NOTE — PROGRESS NOTES
Dru,    Your blood work was very stable.  I am not making any new recommendations at this time.  The testosterone level still pending I will message you when I get those results back.    Rodger Cuba MD

## 2024-09-02 LAB
ALBUMIN SERPL-MCNC: 4.1 G/DL (ref 3.6–5.1)
ALBUMIN/GLOB SERPL: 1.3 (CALC) (ref 1–2.5)
ALP SERPL-CCNC: 71 U/L (ref 35–144)
ALT SERPL-CCNC: 12 U/L (ref 9–46)
AST SERPL-CCNC: 18 U/L (ref 10–35)
BASOPHILS # BLD AUTO: 42 CELLS/UL (ref 0–200)
BASOPHILS NFR BLD AUTO: 0.6 %
BILIRUB SERPL-MCNC: 0.6 MG/DL (ref 0.2–1.2)
BUN SERPL-MCNC: 14 MG/DL (ref 7–25)
BUN/CREAT SERPL: NORMAL (CALC) (ref 6–22)
CALCIUM SERPL-MCNC: 9.2 MG/DL (ref 8.6–10.3)
CHLORIDE SERPL-SCNC: 104 MMOL/L (ref 98–110)
CHOLEST SERPL-MCNC: 175 MG/DL
CHOLEST/HDLC SERPL: 3.4 (CALC)
CO2 SERPL-SCNC: 29 MMOL/L (ref 20–32)
CREAT SERPL-MCNC: 0.96 MG/DL (ref 0.7–1.3)
EGFRCR SERPLBLD CKD-EPI 2021: 92 ML/MIN/1.73M2
EOSINOPHIL # BLD AUTO: 182 CELLS/UL (ref 15–500)
EOSINOPHIL NFR BLD AUTO: 2.6 %
ERYTHROCYTE [DISTWIDTH] IN BLOOD BY AUTOMATED COUNT: 12.3 % (ref 11–15)
FSH SERPL-ACNC: 6 MIU/ML (ref 1.4–12.8)
GLOBULIN SER CALC-MCNC: 3.2 G/DL (CALC) (ref 1.9–3.7)
GLUCOSE SERPL-MCNC: 87 MG/DL (ref 65–99)
HCT VFR BLD AUTO: 45.4 % (ref 38.5–50)
HDLC SERPL-MCNC: 52 MG/DL
HGB BLD-MCNC: 14.7 G/DL (ref 13.2–17.1)
LDLC SERPL CALC-MCNC: 104 MG/DL (CALC)
LH SERPL-ACNC: 7.3 MIU/ML (ref 1.5–9.3)
LYMPHOCYTES # BLD AUTO: 3003 CELLS/UL (ref 850–3900)
LYMPHOCYTES NFR BLD AUTO: 42.9 %
MCH RBC QN AUTO: 27.8 PG (ref 27–33)
MCHC RBC AUTO-ENTMCNC: 32.4 G/DL (ref 32–36)
MCV RBC AUTO: 85.8 FL (ref 80–100)
MONOCYTES # BLD AUTO: 574 CELLS/UL (ref 200–950)
MONOCYTES NFR BLD AUTO: 8.2 %
NEUTROPHILS # BLD AUTO: 3199 CELLS/UL (ref 1500–7800)
NEUTROPHILS NFR BLD AUTO: 45.7 %
NONHDLC SERPL-MCNC: 123 MG/DL (CALC)
PLATELET # BLD AUTO: NORMAL THOUSAND/UL
POTASSIUM SERPL-SCNC: 4.3 MMOL/L (ref 3.5–5.3)
PROT SERPL-MCNC: 7.3 G/DL (ref 6.1–8.1)
PSA SERPL-MCNC: 3.05 NG/ML
RBC # BLD AUTO: 5.29 MILLION/UL (ref 4.2–5.8)
SODIUM SERPL-SCNC: 140 MMOL/L (ref 135–146)
TESTOST FREE SERPL-MCNC: 59.5 PG/ML (ref 35–155)
TESTOST SERPL-MCNC: 305 NG/DL (ref 250–1100)
TRIGL SERPL-MCNC: 101 MG/DL
TSH SERPL-ACNC: 0.62 MIU/L (ref 0.4–4.5)
WBC # BLD AUTO: 7 THOUSAND/UL (ref 3.8–10.8)

## 2025-07-15 ENCOUNTER — PATIENT OUTREACH (OUTPATIENT)
Dept: PRIMARY CARE | Facility: CLINIC | Age: 60
End: 2025-07-15
Payer: COMMERCIAL

## 2025-07-15 NOTE — PROGRESS NOTES
Care Gap Team has outreached to Dru Salazar on behalf of their primary care provider.      [facility]    Care Gap(s) Identified: Annual Wellness Visit    Care Gap Status: Due    Outreach via: Telephone    Adult Preventive Wellness Protocol(s) Used: Annual Wellness Visit    Chart Review Completed: Yes  Patient interview completed: Yes  Inclusion Criteria: Met  Exclusion Criteria: None  Patient educated on recommended care: Yes    Order or Clinical Referral: None          Appointment provided: Yes  Appointment Type: Preventative Care Visit  Appointment Date: 02/05/26  Appointment Time: 0340  Appointment Provider: Dr. Cuba      Pt scheduled for first available EPP. Pt would like to have a physical in 2025 but nothing available, pt is upset he cannot see his doctor this year for a well visit. I informed pt I could send a message into the office to see if anything can be done and someone would reach out to him.